# Patient Record
Sex: FEMALE | Race: WHITE | Employment: FULL TIME | URBAN - METROPOLITAN AREA
[De-identification: names, ages, dates, MRNs, and addresses within clinical notes are randomized per-mention and may not be internally consistent; named-entity substitution may affect disease eponyms.]

---

## 2019-12-07 ENCOUNTER — OFFICE VISIT (OUTPATIENT)
Dept: FAMILY MEDICINE CLINIC | Facility: CLINIC | Age: 44
End: 2019-12-07

## 2019-12-07 VITALS
WEIGHT: 139 LBS | TEMPERATURE: 99 F | RESPIRATION RATE: 16 BRPM | BODY MASS INDEX: 24.63 KG/M2 | HEIGHT: 63 IN | OXYGEN SATURATION: 98 % | SYSTOLIC BLOOD PRESSURE: 138 MMHG | HEART RATE: 82 BPM | DIASTOLIC BLOOD PRESSURE: 86 MMHG

## 2019-12-07 DIAGNOSIS — R39.9 UTI SYMPTOMS: ICD-10-CM

## 2019-12-07 DIAGNOSIS — N30.01 ACUTE CYSTITIS WITH HEMATURIA: Primary | ICD-10-CM

## 2019-12-07 PROCEDURE — 81003 URINALYSIS AUTO W/O SCOPE: CPT | Performed by: NURSE PRACTITIONER

## 2019-12-07 PROCEDURE — 99202 OFFICE O/P NEW SF 15 MIN: CPT | Performed by: NURSE PRACTITIONER

## 2019-12-07 RX ORDER — LISINOPRIL AND HYDROCHLOROTHIAZIDE 12.5; 1 MG/1; MG/1
TABLET ORAL
Refills: 99 | COMMUNITY
Start: 2019-10-10

## 2019-12-07 RX ORDER — NITROFURANTOIN 25; 75 MG/1; MG/1
100 CAPSULE ORAL 2 TIMES DAILY
Qty: 10 CAPSULE | Refills: 0 | Status: SHIPPED | OUTPATIENT
Start: 2019-12-07 | End: 2019-12-12

## 2019-12-07 NOTE — PROGRESS NOTES
Chuck Ponce is a 53year old female. HPI:   Patient presents with symptoms of UTI for 2 days. Complaining of urinary frequency, urgency, dysuria,  Denies back pain, fever, hematuria.   Pt has no history of UTI in past. Pt denies any new sexual partne Educated on medication  Educated on supportive measures  Educated on s/s of worsening sx and when to seek higher level of care  Follow up with pcp if not improving  Patient Instructions       Understanding Urinary Tract Infections (UTIs)  Most UTIs are cau

## 2020-02-23 ENCOUNTER — OFFICE VISIT (OUTPATIENT)
Dept: FAMILY MEDICINE CLINIC | Facility: CLINIC | Age: 45
End: 2020-02-23
Payer: COMMERCIAL

## 2020-02-23 VITALS
BODY MASS INDEX: 25 KG/M2 | DIASTOLIC BLOOD PRESSURE: 66 MMHG | TEMPERATURE: 99 F | OXYGEN SATURATION: 99 % | WEIGHT: 142.63 LBS | RESPIRATION RATE: 20 BRPM | HEART RATE: 85 BPM | SYSTOLIC BLOOD PRESSURE: 128 MMHG

## 2020-02-23 DIAGNOSIS — H61.23 BILATERAL IMPACTED CERUMEN: Primary | ICD-10-CM

## 2020-02-23 PROCEDURE — 99213 OFFICE O/P EST LOW 20 MIN: CPT | Performed by: NURSE PRACTITIONER

## 2020-02-23 NOTE — PROGRESS NOTES
CHIEF COMPLAINT:   Patient presents with:  Ear Problem: both ear clogged, pain, stuffy nose x4days      HPI:   Lynn Amaro is a 53year old female who presents to clinic today with complaints of bilateral ear feels clogged. Has had for 3  days.  No ea bilaterally, no wheezes or rhonchi. Breathing is non labored. CARDIO: RRR without murmur  EXTREMITIES: no cyanosis, clubbing or edema  LYMPH: no lymphadenopathy.       ASSESSMENT AND PLAN:   Phoebe Flores is a 53year old female who presents with ear pro

## 2021-10-19 ENCOUNTER — EMPLOYEE HEALTH (OUTPATIENT)
Dept: OTHER | Facility: HOSPITAL | Age: 46
End: 2021-10-19
Attending: PREVENTIVE MEDICINE

## 2021-10-19 DIAGNOSIS — Z11.1 SCREENING-PULMONARY TB: ICD-10-CM

## 2021-10-19 DIAGNOSIS — Z01.84 IMMUNITY STATUS TESTING: Primary | ICD-10-CM

## 2021-10-19 PROCEDURE — 86735 MUMPS ANTIBODY: CPT

## 2021-10-19 PROCEDURE — 86787 VARICELLA-ZOSTER ANTIBODY: CPT

## 2021-10-19 PROCEDURE — 86762 RUBELLA ANTIBODY: CPT

## 2021-10-19 PROCEDURE — 86480 TB TEST CELL IMMUN MEASURE: CPT

## 2021-10-19 PROCEDURE — 86765 RUBEOLA ANTIBODY: CPT

## 2022-06-08 ENCOUNTER — TELEPHONE (OUTPATIENT)
Dept: INTERNAL MEDICINE CLINIC | Facility: HOSPITAL | Age: 47
End: 2022-06-08

## 2022-09-09 ENCOUNTER — LAB ENCOUNTER (OUTPATIENT)
Dept: LAB | Facility: HOSPITAL | Age: 47
End: 2022-09-09
Attending: PREVENTIVE MEDICINE

## 2022-09-09 ENCOUNTER — TELEPHONE (OUTPATIENT)
Dept: INTERNAL MEDICINE CLINIC | Facility: HOSPITAL | Age: 47
End: 2022-09-09

## 2022-09-09 DIAGNOSIS — Z20.822 EXPOSURE TO COVID-19 VIRUS: ICD-10-CM

## 2022-09-09 DIAGNOSIS — Z20.822 EXPOSURE TO COVID-19 VIRUS: Primary | ICD-10-CM

## 2022-09-09 LAB — SARS-COV-2 RNA RESP QL NAA+PROBE: NOT DETECTED

## 2022-10-06 ENCOUNTER — TELEPHONE (OUTPATIENT)
Dept: INTERNAL MEDICINE CLINIC | Facility: HOSPITAL | Age: 47
End: 2022-10-06

## 2022-10-06 ENCOUNTER — LAB ENCOUNTER (OUTPATIENT)
Dept: LAB | Facility: HOSPITAL | Age: 47
End: 2022-10-06
Attending: PREVENTIVE MEDICINE
Payer: COMMERCIAL

## 2022-10-06 DIAGNOSIS — Z20.822 EXPOSURE TO COVID-19 VIRUS: Primary | ICD-10-CM

## 2022-10-06 DIAGNOSIS — Z20.822 EXPOSURE TO COVID-19 VIRUS: ICD-10-CM

## 2022-10-06 LAB — SARS-COV-2 RNA RESP QL NAA+PROBE: NOT DETECTED

## 2022-10-12 ENCOUNTER — LAB ENCOUNTER (OUTPATIENT)
Dept: LAB | Facility: HOSPITAL | Age: 47
End: 2022-10-12
Attending: PREVENTIVE MEDICINE
Payer: COMMERCIAL

## 2022-10-12 DIAGNOSIS — Z20.822 EXPOSURE TO COVID-19 VIRUS: ICD-10-CM

## 2022-10-12 LAB — SARS-COV-2 RNA RESP QL NAA+PROBE: NOT DETECTED

## 2022-10-13 NOTE — TELEPHONE ENCOUNTER
Results and RTW guidelines:    COVID RESULT:    [x] Viewed by employee in [facility]. RTW plan and instructions as indicated on triage call. Manager notified. Estimated RTW date:   [] Discussed with employee   [x] Unable to reach by phone. Sent via [employer] message      Test type:    [x] Rapid         [] Alinity         [] Outside test:       [x] NEGATIVE           Notes:     RTW PLAN:    []  If COVID positive results, off work minimum of 5 days from positive test or onset of symptoms (day 0)        On day 5, if asymptomatic or mildly symptomatic (with improving symptoms) may return to work day 6          On day 5, if symptomatic, call Employee Health for RTW screening        []  COVID positive result - call Employee Health on day 5 after symptom onset. The employee needs to be cleared by Employee Health to RTW. [] RTW immediately, continue to monitor for sx  [] RTW when sx improve; must be fever free for 24 hours w/o medications, Diarrhea/Vomiting free for 24 hours w/o medications  [] Alinity ordered; continue to monitor sx and call for new/worsening sx.   Discuss RTW guidelines with manager  [x] May continue to work  [] Follow up with PCP  [] Home until further instruction from hotline with Alinity results  INSTRUCTIONS PROVIDED:  [x]  Plan as noted above  []  Length of time to obtain results   []  Quarantine instructions  []  Masking protocol   []  S/S of worsening infection/condition and importance of prompt medical re-evaluation including when to seek emergency care  [] If symptoms develop, stay home and call hotline for rapid test order    Estimated RTW date:      [] The employee voiced understanding of above plan/instructions  [x] Manager Notified

## 2022-12-01 ENCOUNTER — IMMUNIZATION (OUTPATIENT)
Dept: LAB | Age: 47
End: 2022-12-01
Attending: EMERGENCY MEDICINE
Payer: COMMERCIAL

## 2022-12-01 DIAGNOSIS — Z23 NEED FOR VACCINATION: Primary | ICD-10-CM

## 2022-12-01 PROCEDURE — 90686 IIV4 VACC NO PRSV 0.5 ML IM: CPT

## 2022-12-01 PROCEDURE — 90471 IMMUNIZATION ADMIN: CPT

## 2022-12-02 ENCOUNTER — TELEPHONE (OUTPATIENT)
Dept: INTERNAL MEDICINE CLINIC | Facility: HOSPITAL | Age: 47
End: 2022-12-02

## 2022-12-02 DIAGNOSIS — Z20.822 EXPOSURE TO COVID-19 VIRUS: Primary | ICD-10-CM

## 2022-12-06 ENCOUNTER — LAB ENCOUNTER (OUTPATIENT)
Dept: LAB | Facility: HOSPITAL | Age: 47
End: 2022-12-06
Attending: PREVENTIVE MEDICINE
Payer: COMMERCIAL

## 2022-12-06 DIAGNOSIS — Z20.822 EXPOSURE TO COVID-19 VIRUS: ICD-10-CM

## 2022-12-06 LAB — SARS-COV-2 RNA RESP QL NAA+PROBE: NOT DETECTED

## 2022-12-06 NOTE — TELEPHONE ENCOUNTER
Results and RTW guidelines:    COVID RESULT:    [] Viewed by employee in [address] RTW plan and instructions as indicated on triage call. Manager notified. Estimated RTW date:   [] Discussed with employee   [x] Unable to reach by phone. Sent via [employer] message      Test type:    [x] Rapid         [] Alinity         [] Outside test:       [x] NEGATIVE     Ordered Alinity retest?  []Yes   [x] No (skip to RTW)   Ordered Rapid retest?   []Yes   [x] No (skip to RTW)         Notes:     RTW PLAN:    []  If COVID positive results, off work minimum of 5 days from positive test or onset of symptoms (day 0)        On day 5, if asymptomatic or mildly symptomatic (with improving symptoms) may return to work day 6          On day 5, if symptomatic, call Employee Health for RTW screening        []  COVID positive result - call Employee Health on day 5 after symptom onset. The employee needs to be cleared by Employee Health to RTW. [] RTW immediately, continue to monitor for sx  [] RTW when sx improve; must be fever free for 24 hours w/o medications, Diarrhea/Vomiting free for 24 hours w/o medications  [] Alinity ordered; continue to monitor sx and call for new/worsening sx.   Discuss RTW guidelines with manager  [x] May continue to work  [] Follow up with PCP  [] Home until further instruction from hotline with Alinity results  INSTRUCTIONS PROVIDED:  [x]  Plan as noted above  []  Length of time to obtain results   []  Quarantine instructions  []  Masking protocol   []  S/S of worsening infection/condition and importance of prompt medical re-evaluation including when to seek emergency care  [] If symptoms develop, stay home and call hotline for rapid test order    Estimated RTW date:      [] The employee voiced understanding of above plan/instructions  [x] Manager Notified

## 2023-05-11 ENCOUNTER — HOSPITAL ENCOUNTER (OUTPATIENT)
Age: 48
Discharge: HOME | End: 2023-05-11
Payer: COMMERCIAL

## 2023-05-11 VITALS
WEIGHT: 141 LBS | RESPIRATION RATE: 16 BRPM | BODY MASS INDEX: 25.95 KG/M2 | OXYGEN SATURATION: 98 % | HEART RATE: 81 BPM | SYSTOLIC BLOOD PRESSURE: 132 MMHG | HEIGHT: 62 IN | DIASTOLIC BLOOD PRESSURE: 86 MMHG | TEMPERATURE: 98 F

## 2023-05-11 DIAGNOSIS — H00.012 HORDEOLUM EXTERNUM OF RIGHT LOWER EYELID: Primary | ICD-10-CM

## 2023-05-11 PROCEDURE — 99214 OFFICE O/P EST MOD 30 MIN: CPT

## 2023-05-11 PROCEDURE — 99213 OFFICE O/P EST LOW 20 MIN: CPT

## 2023-05-11 RX ORDER — ERYTHROMYCIN 5 MG/G
1 OINTMENT OPHTHALMIC EVERY 6 HOURS
Qty: 1 G | Refills: 0 | Status: SHIPPED | OUTPATIENT
Start: 2023-05-11 | End: 2023-05-18

## 2023-05-11 NOTE — DISCHARGE INSTRUCTIONS
Follow-up with your primary care physician in one week if symptoms have not improved or symptoms are starting to get worse. Increase fluids, keep well-hydrated. Take Tylenol and Motrin for fever and pain. Apply the erythromycin every 6 hours to the right eye  Warm compress to the eye highly recommended  Return to the emergency room for with symptoms or concerns.

## 2023-05-11 NOTE — ED INITIAL ASSESSMENT (HPI)
Patient presents to IC with c/o \"pimple\"to lower lid of right eye x 3 days with swelling. No fever.

## 2023-12-30 ENCOUNTER — TELEPHONE (OUTPATIENT)
Dept: INTERNAL MEDICINE CLINIC | Facility: HOSPITAL | Age: 48
End: 2023-12-30

## 2023-12-30 ENCOUNTER — LAB ENCOUNTER (OUTPATIENT)
Dept: LAB | Age: 48
End: 2023-12-30
Attending: PREVENTIVE MEDICINE
Payer: COMMERCIAL

## 2023-12-30 DIAGNOSIS — Z20.822 EXPOSURE TO CONFIRMED CASE OF COVID-19: Primary | ICD-10-CM

## 2023-12-30 DIAGNOSIS — Z20.822 EXPOSURE TO CONFIRMED CASE OF COVID-19: ICD-10-CM

## 2023-12-30 PROCEDURE — 87635 SARS-COV-2 COVID-19 AMP PRB: CPT

## 2023-12-30 NOTE — TELEPHONE ENCOUNTER
[x] EH  []TANISHA   [] [facility]  Manager : Bib High    HAVE YOU RECEIVED THE COVID-19 Vaccine? Yes [x]    No []          If yes, date(s) received: 01/22/2021; 02/12/2021           Which vaccine:  Pfizer [x]     Moderna []    J&J []      SYMPTOMS (reported via dashboard):  [x] asymptomatic  [] symptomatic  [] GI symptoms only    Symptom onset date: n/a  Fever   > 100F             Yes []      Cough                          Yes []      Shortness of breath  Yes []      Congestion                 Yes []      Runny nose                Yes []        Loss of Smell              Yes []        Loss of Taste             Yes []       Sore throat                 Yes []       Fatigue                        Yes []       Body Aches                Yes []        Chills                           Yes []        Headache                   Yes []             GI symptoms             Yes []     No [x]                     Nausea   []          Vomiting            []                                    Diarrhea  []          Upset stomach []      Employee has positive COVID Exposure?  Yes [x]     No []    Date of exposure: 12/28/2023  []  Coworker                       [x] patient                        [] Family/friend    Employee has a history of Covid?  Yes [x]     No []   If Yes, when: 07/2022    When was the last shift you worked?: 12/29/2023      PLAN:     COVID-19 testing ordered: [] Rapid    [x] Alinity              Date test is to be taken:   12/30/2023    []  Outside testing                           Notes:    INSTRUCTIONS PROVIDED:    [x]  Employee was instructed to call Central scheduling at [phone] or use [employer] to make an appointment for their testing   []  May return to work if employee views negative result in MyChart and remains fever, vomiting, and diarrhea free  [x]  May continue to work if remains asymptomatic and views negative result in MyChart  []  Follow up for condition update when resulting  []  If symptoms develop,  stay home and call hotline for rapid test order  []  If COVID positive results, off work minimum of 5 days from positive test or onset of symptoms (day 0)     [x]  Plan noted above  [x]  Length of time to obtain results  []  Quarantine instructions  [x]  S/S of worsening infection/condition and importance of prompt medical re-evaluation including when to seek emergency care.   [] The employee voiced understanding

## 2023-12-31 LAB — SARS-COV-2 RNA RESP QL NAA+PROBE: NOT DETECTED

## 2024-01-01 NOTE — TELEPHONE ENCOUNTER
Results and RTW guidelines:    COVID RESULT:    [x] Viewed by employee in [employer].  RTW plan and instructions as indicated on triage call.  Manager notified.  Estimated RTW date: 12/30/23  [] Discussed with employee   [] Unable to reach by phone.  Sent via [employer] message      Test type:    [] Rapid         [x] Alinity         [] Outside test:       [x] NEGATIVE     Ordered Alinity retest?  [x]Yes   [] No (skip to RTW)   Ordered Rapid retest?   []Yes   [x] No (skip to RTW)           Dated to be taken:  01/03/24    If Yes, PLACE ORDER NOW and instruct the following:  -Originally Symptomatic or Now Symptoms:   -RTW when sx improve- fever free for 24 hours w/o medications, Diarrhea/Vomiting for 24 hours w/o medications    -Originally  Asymptomatic  -Asymptomatic AND Vaccinated or Unvaccinated or Prior infection in past 90 days:   -May work and continue to monitor symptoms for the next 10 days.                                         -Alinity test day 2, Alinity test day 5 (day 0 - exposure)

## 2024-01-03 ENCOUNTER — LAB ENCOUNTER (OUTPATIENT)
Dept: LAB | Facility: HOSPITAL | Age: 49
End: 2024-01-03
Attending: PREVENTIVE MEDICINE

## 2024-01-03 DIAGNOSIS — Z20.822 EXPOSURE TO CONFIRMED CASE OF COVID-19: ICD-10-CM

## 2024-01-03 PROCEDURE — 87635 SARS-COV-2 COVID-19 AMP PRB: CPT

## 2024-01-04 LAB — SARS-COV-2 RNA RESP QL NAA+PROBE: NOT DETECTED

## 2024-01-04 NOTE — TELEPHONE ENCOUNTER
Results and RTW guidelines:    COVID RESULT:    [x] Viewed by employee in [employer].  RTW plan and instructions as indicated on triage call.  Manager notified.  Estimated RTW date:   [] Discussed with employee   [] Unable to reach by phone.  Sent via [employer] message      Test type:    [] Rapid         [x] Alinity         [] Outside test:       [x] NEGATIVE     Ordered Alinity retest?  []Yes   [x] No (skip to RTW)   Ordered Rapid retest?   []Yes   [x] No (skip to RTW)           Dated to be taken:      If Yes, PLACE ORDER NOW and instruct the following:  -Originally Symptomatic or Now Symptoms:   -RTW when sx improve- fever free for 24 hours w/o medications, Diarrhea/Vomiting for 24 hours w/o medications    -Originally  Asymptomatic  -Asymptomatic AND Vaccinated or Unvaccinated or Prior infection in past 90 days:   -May work and continue to monitor symptoms for the next 10 days.                                         -Alinity test day 2, Alinity test day 5 (day 0 - exposure)     Notes:     RTW PLAN:    []  If COVID positive results, off work minimum of 5 days from positive test or onset of symptoms (day 0)        On day 5, if asymptomatic or mildly symptomatic (with improving symptoms) may return to work day 6          On day 5, if symptomatic, call Employee Health for RTW screening        []  COVID positive result - call Employee Health on day 5 after symptom onset.  The employee needs to be cleared by Employee Health to RTW.  [] RTW immediately, continue to monitor for sx  [] RTW when sx improve; must be fever free for 24 hours w/o medications, Diarrhea/Vomiting free for 24 hours w/o medications  [] Alinity ordered; continue to monitor sx and call for new/worsening sx.  Discuss RTW guidelines with manager  [x] May continue to work  [] Follow up with PCP  [] Home until further instruction from hotline with Alinity results  INSTRUCTIONS PROVIDED:  [x]  Plan as noted above  []  Length of time to obtain results   []   Quarantine instructions  []  Masking protocol   []  S/S of worsening infection/condition and importance of prompt medical re-evaluation including when to seek emergency care  [] If symptoms develop, stay home and call hotline for rapid test order    Estimated RTW date:      [] The employee voiced understanding of above plan/instructions  [x] Manager Notified

## 2024-04-16 ENCOUNTER — HOSPITAL ENCOUNTER (OUTPATIENT)
Dept: MAMMOGRAPHY | Age: 49
Discharge: HOME | End: 2024-04-16
Attending: FAMILY MEDICINE
Payer: COMMERCIAL

## 2024-04-16 DIAGNOSIS — Z12.31 ENCOUNTER FOR SCREENING MAMMOGRAM FOR MALIGNANT NEOPLASM OF BREAST: ICD-10-CM

## 2024-04-16 PROCEDURE — 77063 BREAST TOMOSYNTHESIS BI: CPT | Performed by: FAMILY MEDICINE

## 2024-04-16 PROCEDURE — 77067 SCR MAMMO BI INCL CAD: CPT | Performed by: FAMILY MEDICINE

## 2024-05-02 ENCOUNTER — HOSPITAL ENCOUNTER (OUTPATIENT)
Dept: MAMMOGRAPHY | Facility: HOSPITAL | Age: 49
Discharge: HOME | End: 2024-05-02
Attending: FAMILY MEDICINE
Payer: COMMERCIAL

## 2024-05-02 DIAGNOSIS — R92.2 INCONCLUSIVE MAMMOGRAM: ICD-10-CM

## 2024-05-02 PROCEDURE — 77065 DX MAMMO INCL CAD UNI: CPT | Performed by: FAMILY MEDICINE

## 2024-05-02 PROCEDURE — 76642 ULTRASOUND BREAST LIMITED: CPT | Performed by: FAMILY MEDICINE

## 2024-05-02 PROCEDURE — 77061 BREAST TOMOSYNTHESIS UNI: CPT | Performed by: FAMILY MEDICINE

## 2024-05-02 NOTE — IMAGING NOTE
This Breast Care RN assisted Dr. Mccarty with recommendation for a left breast 1 site ultrasound guided biopsy for mass.  Procedure reviewed and all questions answered. Emotional and educational support given. On the day of the biopsy, pt instructed to take Tylenol 1000mg PO, eat a light meal & bring or wear a sports bra.  Post biopsy care also reviewed with pt to include NO lifting more than 5lbs, no exercising or housework (limit upper body movement) for 24-48 hrs post biopsy. Patient denies blood thinners, bleeding disorders, liver disease, and chemo.Pt verbalized understanding. An appt is being held for Monday, 5-6-24 at 8:30 am, arriving at 8 am, pending order.

## 2024-05-06 ENCOUNTER — HOSPITAL ENCOUNTER (OUTPATIENT)
Dept: MAMMOGRAPHY | Facility: HOSPITAL | Age: 49
Discharge: HOME | End: 2024-05-06
Attending: FAMILY MEDICINE
Payer: COMMERCIAL

## 2024-05-06 DIAGNOSIS — N63.0 BREAST MASS: ICD-10-CM

## 2024-05-06 PROCEDURE — 88305 TISSUE EXAM BY PATHOLOGIST: CPT | Performed by: FAMILY MEDICINE

## 2024-05-06 PROCEDURE — 19083 BX BREAST 1ST LESION US IMAG: CPT | Performed by: FAMILY MEDICINE

## 2024-05-06 PROCEDURE — 77065 DX MAMMO INCL CAD UNI: CPT | Performed by: FAMILY MEDICINE

## 2024-05-07 ENCOUNTER — TELEPHONE (OUTPATIENT)
Dept: MAMMOGRAPHY | Facility: HOSPITAL | Age: 49
End: 2024-05-07

## 2024-05-07 NOTE — TELEPHONE ENCOUNTER
Telephoned Amandareynold Suggs and name,  verified with patient. Notified Amanda Suggs of left breast negative for malignancy but positive for fibroepithelial lesion biopsy result. Concordance pending. Amanda Es reports biopsy site is healing well.  Radiologist recommends surgical consultation. Unable to get a surgeon referral from Dr Kimble's office at this time. The office plans to call back. Patient works at [employer] and prefers to see a surgeon here. Patient provided with the contact information for [facility] breast surgeons and instructed to call one of them for a consultation appt. Pt verbalized understanding and had no further questions at this time.

## 2024-05-20 NOTE — CONSULTS
Breast Surgery New Patient Consultation    Amanda Suggs is a 48 year old patient, referred by Dr. 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    History of Present Illness:   Ms. Amanda Suggs is a 48 year old woman with history of hypertension who presents for evaluation of left breast fibroepithelial lesion.     About 2 months ago she self palpated a left breast lump.  She underwent screening mammogram on 2024 that showed a focal asymmetry in the left breast.  It also showed density D breasts.  On 2024 she underwent diagnostic imaging that showed a lobulated mass at the 1 o'clock position 4 cm from the nipple.  It measured 3 x 4.5 x 2.2 cm.  She underwent biopsy on 2024, a coil clip was placed.  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 The differential diagnosis includes cellular fibroadenoma versus phyllodes tumor.    She denies any nipple discharge, skin changes, or axillary adenopathy. She does not have breast pain. She has a history of bilateral breast cyst aspiration in 2016.  In 2016 she also had a left breast excision of a cyst that was benign on final pathology.  She does have family history of breast cancer: Her mother had breast cancer in her late 60s, Her maternal aunt had breast cancer at the age of 60. She does not have a history of genetic testing.     She is from [place]. She lived in [place] for several years. 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 She is going to start MA school soon.        No past medical history on file.    Past Surgical History:   Procedure Laterality Date    Cyst aspiration left Left 2016    benign       Gynecological History:  Menarche at age 14 and LMP 22  Pt is a   Pt was 29 years old at time of first pregnancy.    She has cumulative breastfeeding history of 6 months  Age of Menopause: n/a   She denies any history of hormone replacement therapy   She denies any history of  oral contraceptive use   She denies infertility treatment to achieve pregnancy.    Medications:     metoprolol succinate ER 50 MG Oral Tablet 24 Hr Take 1 tablet (50 mg total) by mouth daily.         Allergies:    No Known Allergies    Family History:   Family History   Problem Relation Age of Onset    Breast Cancer Mother 60    Breast Cancer Maternal Aunt 60       She is not of Ashkenazi Catholic ancestry.    Social History:  History   Alcohol Use Never       History   Smoking Status    Never   Smokeless Tobacco    Never       Review of Systems:    Review of Systems   Constitutional:  Negative for activity change, appetite change, chills, fatigue, fever and unexpected weight change.   HENT:  Negative for ear pain, hearing loss, nosebleeds, sore throat, trouble swallowing and voice change.    Eyes:  Negative for pain and visual disturbance.   Respiratory:  Negative for cough, chest tightness and shortness of breath.    Cardiovascular:  Negative for chest pain, palpitations and leg swelling.   Gastrointestinal:  Negative for nausea, vomiting, abdominal pain, diarrhea, constipation and blood in stool.   Endocrine: Negative for cold intolerance and heat intolerance.   Genitourinary:  Negative for dysuria, hematuria and difficulty urinating.   Musculoskeletal:  Negative for back pain, joint swelling, joint pain and neck pain.   Skin:  Negative for color change, rash and wound.   Allergic/Immunologic: Negative for environmental allergies.   Neurological:  Negative for tremors, syncope, facial asymmetry, speech difficulty, weakness, numbness and headaches.   Hematological:  Negative for adenopathy. Does not bruise/bleed easily.   Psychiatric/Behavioral:  Negative for hallucinations, behavioral problems, confusion, agitation and depressed mood.       Otherwise as per HPI.    Physical Exam:    /77 (BP Location: Right arm, Patient Position: Sitting, Cuff Size: adult)   Pulse 84   Temp 98.1 °F (36.7 °C) (Tympanic)    Resp 16   Wt 67.6 kg (149 lb)   SpO2 97%   BMI 27.25 kg/m²     Physical Exam  Vitals reviewed.   Constitutional:       Appearance: Normal appearance.   HENT:      Head: Normocephalic and atraumatic.   Eyes:      Extraocular Movements: Extraocular movements intact.      Pupils: Pupils are equal, round, and reactive to light.   Cardiovascular:      Rate and Rhythm: Normal rate.   Pulmonary:      Effort: Pulmonary effort is normal.   Chest:   Breasts:     Right: Normal. No mass, nipple discharge, skin change or tenderness.      Left: Normal. No mass, nipple discharge, skin change or tenderness.          Comments: Well healed surgical incision  Abdominal:      General: Abdomen is flat.      Palpations: Abdomen is soft.   Musculoskeletal:         General: Normal range of motion.      Cervical back: Normal range of motion and neck supple.   Lymphadenopathy:      Upper Body:      Right upper body: No supraclavicular or axillary adenopathy.      Left upper body: No supraclavicular or axillary adenopathy.   Skin:     General: Skin is warm and dry.   Neurological:      General: No focal deficit present.      Mental Status: She is alert and oriented to person, place, and time.   Psychiatric:         Mood and Affect: Mood normal.          Bra size: 36B    Labs/imaging: reviewed in EPIC    Impression:   Ms. Amanda Suggs is a 48 year old woman that presents for evaluation of fibroepithelial lesion.    Discussion and Plan:  I had a discussion with the Patient regarding her breast exam. On exam today I found a  4x3 cm palpable, mobile, circumscribed mass located at 1:00, about 2 cm from nipple. I personally reviewed her recent mammography and ultrasound and we discussed this.    We discussed that a fibroadenoma is a benign growth of tissue that does not increase risk of breast cancer. We discussed that fibroadenomas are usually removed if they have rapid growth, are over 3 cm in size, have an irregular shape/indistinct  margins on imaging, or are causing breast deformity.  We discussed that phyllodes tumor can be benign borderline or malignant.  We discussed that at times only final pathology can reveal a diagnosis.  At this point we should remove this lesion in order to confirm final pathology.  I do recommend excision at this time.  We discussed left breast MAGUI localized excisional biopsy. The risks, benefits, and alternatives were discussed including, but not limited to, seroma development, infection, and bleeding. We also discussed the possibility for upstaging of pathology which would require further surgery on another day. The patient agrees to proceed.      Due to her density D breasts patient would qualify for annual screening ultrasound. We calculated her Tyrer Cuzick score, which revealed an estimated lifetime breast cancer risk of 36.2% and a 10-year breast cancer risk of 9.5%. (Results will be scanned into the chart.) This lifetime risk classifies the patient as \"high risk\" for breast cancer.     Genetics: Patient is interested in genetic testing and has a family history, will refer    Further screening:   Mammogram: resume annual mammograms in April 2025  Screening ultrasound versus MRI: qualifies for MRI screening due to high risk as well as breast density. Patient will decide whether she would like to continue with ultrasound or MRI. Next one would be around October 2024    Chemoprophylaxis: briefly discussed, will touch base again after surgery    Return to clinic:   1 year for breast exam due to high risk of breast cancer  See PCP/OB/GYN at least annually for additional breast exam    She was given ample opportunity for questions and those questions were answered to her satisfaction. She has been encouraged to contact the office with any questions or concerns prior to her next appointment.     This encounter lasted a total of 60 minutes, more than 50% of which was dedicated to the discussion of management options.      Danny Asher MD  Breast Surgical Oncology    CC: Ellen Wellington

## 2024-05-21 ENCOUNTER — OFFICE VISIT (OUTPATIENT)
Dept: SURGERY | Facility: CLINIC | Age: 49
End: 2024-05-21

## 2024-05-21 ENCOUNTER — TELEPHONE (OUTPATIENT)
Dept: SURGERY | Facility: CLINIC | Age: 49
End: 2024-05-21

## 2024-05-21 VITALS
OXYGEN SATURATION: 97 % | TEMPERATURE: 98 F | WEIGHT: 149 LBS | BODY MASS INDEX: 27 KG/M2 | HEART RATE: 84 BPM | RESPIRATION RATE: 16 BRPM | DIASTOLIC BLOOD PRESSURE: 77 MMHG | SYSTOLIC BLOOD PRESSURE: 143 MMHG

## 2024-05-21 DIAGNOSIS — N63.21 MASS OF UPPER OUTER QUADRANT OF LEFT BREAST: Primary | ICD-10-CM

## 2024-05-21 PROCEDURE — 99205 OFFICE O/P NEW HI 60 MIN: CPT | Performed by: SURGERY

## 2024-05-21 RX ORDER — METOPROLOL SUCCINATE 50 MG/1
50 TABLET, EXTENDED RELEASE ORAL DAILY
COMMUNITY
Start: 2024-04-22

## 2024-05-21 NOTE — PATIENT INSTRUCTIONS
Dr. Danny Asher  Tel: [phone]  Fax: [phone] [place] [address]  [phone]     Surgery/Procedure: Left breast Saba localized excisional Biopsy,      Anesthesia:   Gen  Surgery Length:   1 hour CPT:  49119   Wire LOC:   No   Nuc Med:   No   Saba Seed:  Yes     Dx & ICD-10: N60.22   Radiology Instructions: Left Breast 1:00 postion 4cm from the nipple, Coil Clipped, Biopsy Demonstrated, -Fibroepithelial lesion with cellular stroma (up to 1.1 cm).     Location (quadrant, o'clock, and cm from the nipple if included), clip shape (ie. Tophat, coil, 1, etc.), and pathology (ie. Biopsy confirmed...). (If pathology is discordant, write, \"biopsy proven___, discordant findings\")   _______________________________________________________________________________    Someone must accompany you the day of the procedure to drive you home safely, because of anesthesia.  You will need an adult  to stay with you the first night following your surgery.  You must remove any kind of makeup, acrylic nails, lotions, powders, creams or deodorant.  [facility] ONLY: Pre-admission will give instructions on when to take Gatorade and Tylenol/acetaminophen prior to your surgery, purchase 2 - 12oz bottles of regular Gatorade (NOT RED/SUGAR FREE). Otherwise, you may not eat or drink anything else after 11PM the night before surgery.    Wear comfortable clothing that can be easily removed.  If you wear dentures, contacts lenses, or any prosthesis, you will be asked to remove them.  Do not drink alcohol or smoke 24 hours prior to your procedure.  Bring a picture ID and your insurance card.  Covid-19 testing is no longer required before surgery unless you are experiencing symptoms such as fever, cough, congestion, etc.  The Pre-Admission Testing Department will call the day before to confirm your procedure, give you the time you need to arrive by and directions on where to go. They begin making  calls after 2pm, if you are not contacted by 4pm, please call the surgeon's office listed above.  Do not take any blood thinners at least one week prior to the procedure/surgery. This includes aspirin, baby aspirin, Motrin, Ibuprofen, herbal supplements, diet medications, vitamin E, fish oil and green tea supplements. Please check other supplements for these ingredients. *TYLENOL or acetaminophen is acceptable*  If you take Coumadin, Plavix, Xarelto, or Eliquis, please contact your prescribing physician for special instructions on how long to hold. If you take insulin contact your primary care physician for special instructions.  Our Surgery scheduler, Sera, will be contacting you to discuss surgery dates. If you have any questions related to scheduling your surgery, please reach out to her at [phone].  _____________________________________________________________________  PRE-OPERATIVE TESTING IF INDICATED BELOW  Please Complete ASAP ( at least 14-21 days prior to surgery)  Please call Central Scheduling to schedule an appointment for pre-operative labs/tests @ [phone]  [] CBC [] BMP [] CMP [] EKG    [] PT, PTT, INR [] Cardiac Clearance  [] H&P Medical Clearance [] Chest X-ray             Does the patient have a pacemaker or ICD?                          Does the patient have sleep apnea?                               [] Yes   [] No                                                    [] Yes   [] No         Please call [phone] to schedule an appointment with genetic counselor.

## 2024-05-22 ENCOUNTER — TELEPHONE (OUTPATIENT)
Dept: MAMMOGRAPHY | Facility: HOSPITAL | Age: 49
End: 2024-05-22

## 2024-05-22 RX ORDER — SPIRONOLACTONE 25 MG/1
25 TABLET ORAL DAILY
COMMUNITY

## 2024-05-22 NOTE — TELEPHONE ENCOUNTER
Phoned Amanda Suggs regarding Saba  localization process of breast for excisional biopsy scheduled for 6-06-24 with Dr. Asher. Procedure explained and all questions answered. Patient transferred to schedulers to schedule procedure.

## 2024-05-23 ENCOUNTER — LABORATORY ENCOUNTER (OUTPATIENT)
Dept: LAB | Facility: HOSPITAL | Age: 49
End: 2024-05-23
Attending: SURGERY

## 2024-05-23 ENCOUNTER — EKG ENCOUNTER (OUTPATIENT)
Dept: LAB | Facility: HOSPITAL | Age: 49
End: 2024-05-23
Attending: SURGERY

## 2024-05-23 DIAGNOSIS — Z01.818 PRE-OP TESTING: ICD-10-CM

## 2024-05-23 LAB
ANION GAP SERPL CALC-SCNC: 8 MMOL/L (ref 0–18)
BUN BLD-MCNC: 11 MG/DL (ref 9–23)
CALCIUM BLD-MCNC: 9.4 MG/DL (ref 8.5–10.1)
CHLORIDE SERPL-SCNC: 104 MMOL/L (ref 98–112)
CO2 SERPL-SCNC: 26 MMOL/L (ref 21–32)
CREAT BLD-MCNC: 0.64 MG/DL
EGFRCR SERPLBLD CKD-EPI 2021: 108 ML/MIN/1.73M2 (ref 60–?)
FASTING STATUS PATIENT QL REPORTED: YES
GLUCOSE BLD-MCNC: 96 MG/DL (ref 70–99)
OSMOLALITY SERPL CALC.SUM OF ELEC: 285 MOSM/KG (ref 275–295)
POTASSIUM SERPL-SCNC: 3.6 MMOL/L (ref 3.5–5.1)
SODIUM SERPL-SCNC: 138 MMOL/L (ref 136–145)

## 2024-05-23 PROCEDURE — 80048 BASIC METABOLIC PNL TOTAL CA: CPT

## 2024-05-23 PROCEDURE — 36415 COLL VENOUS BLD VENIPUNCTURE: CPT

## 2024-05-23 PROCEDURE — 93005 ELECTROCARDIOGRAM TRACING: CPT

## 2024-05-23 PROCEDURE — 93010 ELECTROCARDIOGRAM REPORT: CPT | Performed by: INTERNAL MEDICINE

## 2024-05-24 LAB
ATRIAL RATE: 77 BPM
P AXIS: 47 DEGREES
P-R INTERVAL: 134 MS
Q-T INTERVAL: 390 MS
QRS DURATION: 86 MS
QTC CALCULATION (BEZET): 441 MS
R AXIS: 40 DEGREES
T AXIS: 38 DEGREES
VENTRICULAR RATE: 77 BPM

## 2024-05-28 ENCOUNTER — HOSPITAL ENCOUNTER (OUTPATIENT)
Dept: MAMMOGRAPHY | Facility: HOSPITAL | Age: 49
Discharge: HOME | End: 2024-05-28
Attending: SURGERY

## 2024-05-28 DIAGNOSIS — N63.21 MASS OF UPPER OUTER QUADRANT OF LEFT BREAST: ICD-10-CM

## 2024-05-28 PROCEDURE — 77065 DX MAMMO INCL CAD UNI: CPT | Performed by: SURGERY

## 2024-05-28 PROCEDURE — 19285 PERQ DEV BREAST 1ST US IMAG: CPT | Performed by: SURGERY

## 2024-05-28 NOTE — IMAGING NOTE
Assisted  with ultrasound guided sanjuanita  localization of the left breast.   Amanda Suggs identified with spelling of name and date of birth.   Medications and allergies reviewed. NKDA reported    History: left breast--Fibroepithelial lesion   Surgery: Left breast Sanjuanita localized excisional Biopsy     Order verified.  Procedure explained and questions answered. Amanda Suggs verbalized understanding and agreement.  Educational materials provided  1054: Written consent obtained.     1115: Scans taken by Vania- ultrasound technologist    1121: Dr. Martinez present    1122: Time out complete.    1122: Site prepped and draped in a sterile manner.   1124: Lidocaine administered for anesthetic affect.  1124: 16G x 5cm reflector needle placed- Left Breast 1:00 position 4cm from the nipple, Coil Clipped, Biopsy Demonstrated, -Fibroepithelial lesion with cellular stroma   1124: Sanjuanita seed deployed  1126: Sanjuanita seed NOT audible with detector wand. Lot # [id number] Expiration 10-07-25  1128  Site prepped and draped in a sterile manner.   1129:  Lidocaine administered for anesthetic affect.  1130:16G x 5cm reflector needle placed- Left Breast 1:00 position 4cm from the nipple, Coil Clipped, Biopsy Demonstrated, -Fibroepithelial lesion with cellular stroma   1130: Sanjuanita seed deployed  1131: Sanjuanita seed audible with detector wand Lot# [id number] Expiration 10-07-25    Emotional support provided.  Amanda Suggs tolerated procedure well.     Site cleaned.  Steri strip applied     Amanda Suggs brought  to mammography in stable condition. Ms. Suggs  without complaints or concerns at this time.   1141: Report and transfer of care to mammography technologist-Crystal  Mammography staff to discharge Amanda Suggs after imaging complete.

## 2024-06-05 ENCOUNTER — ANESTHESIA EVENT (OUTPATIENT)
Dept: SURGERY | Facility: HOSPITAL | Age: 49
End: 2024-06-05
Payer: COMMERCIAL

## 2024-06-06 ENCOUNTER — ANESTHESIA (OUTPATIENT)
Dept: SURGERY | Facility: HOSPITAL | Age: 49
End: 2024-06-06
Payer: COMMERCIAL

## 2024-06-06 ENCOUNTER — HOSPITAL ENCOUNTER (OUTPATIENT)
Dept: MAMMOGRAPHY | Facility: HOSPITAL | Age: 49
Discharge: HOME | End: 2024-06-06
Attending: SURGERY | Admitting: SURGERY
Payer: COMMERCIAL

## 2024-06-06 ENCOUNTER — HOSPITAL ENCOUNTER (OUTPATIENT)
Facility: HOSPITAL | Age: 49
Setting detail: HOSPITAL OUTPATIENT SURGERY
Discharge: HOME | End: 2024-06-06
Attending: SURGERY | Admitting: SURGERY
Payer: COMMERCIAL

## 2024-06-06 VITALS
OXYGEN SATURATION: 97 % | DIASTOLIC BLOOD PRESSURE: 73 MMHG | HEART RATE: 85 BPM | BODY MASS INDEX: 26.19 KG/M2 | HEIGHT: 63 IN | SYSTOLIC BLOOD PRESSURE: 113 MMHG | RESPIRATION RATE: 18 BRPM | WEIGHT: 147.81 LBS | TEMPERATURE: 98 F

## 2024-06-06 DIAGNOSIS — N63.21 MASS OF UPPER OUTER QUADRANT OF LEFT BREAST: ICD-10-CM

## 2024-06-06 DIAGNOSIS — Z01.818 PRE-OP TESTING: Primary | ICD-10-CM

## 2024-06-06 LAB — B-HCG UR QL: NEGATIVE

## 2024-06-06 PROCEDURE — 76098 X-RAY EXAM SURGICAL SPECIMEN: CPT | Performed by: SURGERY

## 2024-06-06 PROCEDURE — 81025 URINE PREGNANCY TEST: CPT

## 2024-06-06 PROCEDURE — 88305 TISSUE EXAM BY PATHOLOGIST: CPT | Performed by: SURGERY

## 2024-06-06 PROCEDURE — 0HBU0ZZ EXCISION OF LEFT BREAST, OPEN APPROACH: ICD-10-PCS | Performed by: SURGERY

## 2024-06-06 RX ORDER — ACETAMINOPHEN 500 MG
1000 TABLET ORAL ONCE
Status: DISCONTINUED | OUTPATIENT
Start: 2024-06-06 | End: 2024-06-06 | Stop reason: HOSPADM

## 2024-06-06 RX ORDER — DEXAMETHASONE SODIUM PHOSPHATE 4 MG/ML
VIAL (ML) INJECTION AS NEEDED
Status: DISCONTINUED | OUTPATIENT
Start: 2024-06-06 | End: 2024-06-06 | Stop reason: SURG

## 2024-06-06 RX ORDER — MIDAZOLAM HYDROCHLORIDE 1 MG/ML
1 INJECTION INTRAMUSCULAR; INTRAVENOUS EVERY 5 MIN PRN
Status: DISCONTINUED | OUTPATIENT
Start: 2024-06-06 | End: 2024-06-06

## 2024-06-06 RX ORDER — HYDROMORPHONE HYDROCHLORIDE 1 MG/ML
0.4 INJECTION, SOLUTION INTRAMUSCULAR; INTRAVENOUS; SUBCUTANEOUS EVERY 5 MIN PRN
Status: DISCONTINUED | OUTPATIENT
Start: 2024-06-06 | End: 2024-06-06

## 2024-06-06 RX ORDER — HEPARIN SODIUM 5000 [USP'U]/ML
5000 INJECTION, SOLUTION INTRAVENOUS; SUBCUTANEOUS ONCE
Status: COMPLETED | OUTPATIENT
Start: 2024-06-06 | End: 2024-06-06

## 2024-06-06 RX ORDER — ONDANSETRON 2 MG/ML
4 INJECTION INTRAMUSCULAR; INTRAVENOUS EVERY 6 HOURS PRN
Status: DISCONTINUED | OUTPATIENT
Start: 2024-06-06 | End: 2024-06-06

## 2024-06-06 RX ORDER — SODIUM CHLORIDE, SODIUM LACTATE, POTASSIUM CHLORIDE, CALCIUM CHLORIDE 600; 310; 30; 20 MG/100ML; MG/100ML; MG/100ML; MG/100ML
INJECTION, SOLUTION INTRAVENOUS CONTINUOUS
Status: DISCONTINUED | OUTPATIENT
Start: 2024-06-06 | End: 2024-06-06

## 2024-06-06 RX ORDER — EPHEDRINE SULFATE 50 MG/ML
INJECTION INTRAVENOUS AS NEEDED
Status: DISCONTINUED | OUTPATIENT
Start: 2024-06-06 | End: 2024-06-06 | Stop reason: SURG

## 2024-06-06 RX ORDER — LIDOCAINE HYDROCHLORIDE AND EPINEPHRINE 10; 10 MG/ML; UG/ML
INJECTION, SOLUTION INFILTRATION; PERINEURAL AS NEEDED
Status: DISCONTINUED | OUTPATIENT
Start: 2024-06-06 | End: 2024-06-06 | Stop reason: HOSPADM

## 2024-06-06 RX ORDER — IBUPROFEN 600 MG/1
600 TABLET ORAL EVERY 6 HOURS PRN
Qty: 50 TABLET | Refills: 0 | Status: SHIPPED | COMMUNITY
Start: 2024-06-06

## 2024-06-06 RX ORDER — LIDOCAINE HYDROCHLORIDE 10 MG/ML
INJECTION, SOLUTION EPIDURAL; INFILTRATION; INTRACAUDAL; PERINEURAL AS NEEDED
Status: DISCONTINUED | OUTPATIENT
Start: 2024-06-06 | End: 2024-06-06 | Stop reason: SURG

## 2024-06-06 RX ORDER — NALOXONE HYDROCHLORIDE 0.4 MG/ML
0.08 INJECTION, SOLUTION INTRAMUSCULAR; INTRAVENOUS; SUBCUTANEOUS AS NEEDED
Status: DISCONTINUED | OUTPATIENT
Start: 2024-06-06 | End: 2024-06-06

## 2024-06-06 RX ORDER — BUPIVACAINE HYDROCHLORIDE 5 MG/ML
INJECTION, SOLUTION EPIDURAL; INTRACAUDAL AS NEEDED
Status: DISCONTINUED | OUTPATIENT
Start: 2024-06-06 | End: 2024-06-06 | Stop reason: HOSPADM

## 2024-06-06 RX ORDER — ACETAMINOPHEN 325 MG/1
650 TABLET ORAL
COMMUNITY

## 2024-06-06 RX ORDER — SERTRALINE HYDROCHLORIDE 25 MG/1
25 TABLET, FILM COATED ORAL DAILY
COMMUNITY
Start: 2024-05-06

## 2024-06-06 RX ORDER — ONDANSETRON 2 MG/ML
INJECTION INTRAMUSCULAR; INTRAVENOUS AS NEEDED
Status: DISCONTINUED | OUTPATIENT
Start: 2024-06-06 | End: 2024-06-06 | Stop reason: SURG

## 2024-06-06 RX ORDER — HYDROCODONE BITARTRATE AND ACETAMINOPHEN 5; 325 MG/1; MG/1
1 TABLET ORAL ONCE AS NEEDED
Status: DISCONTINUED | OUTPATIENT
Start: 2024-06-06 | End: 2024-06-06

## 2024-06-06 RX ORDER — ACETAMINOPHEN 500 MG
1000 TABLET ORAL EVERY 6 HOURS PRN
Qty: 50 TABLET | Refills: 0 | Status: SHIPPED | COMMUNITY
Start: 2024-06-06

## 2024-06-06 RX ORDER — HYDROMORPHONE HYDROCHLORIDE 1 MG/ML
0.2 INJECTION, SOLUTION INTRAMUSCULAR; INTRAVENOUS; SUBCUTANEOUS EVERY 5 MIN PRN
Status: DISCONTINUED | OUTPATIENT
Start: 2024-06-06 | End: 2024-06-06

## 2024-06-06 RX ORDER — HYDROCODONE BITARTRATE AND ACETAMINOPHEN 5; 325 MG/1; MG/1
2 TABLET ORAL ONCE AS NEEDED
Status: DISCONTINUED | OUTPATIENT
Start: 2024-06-06 | End: 2024-06-06

## 2024-06-06 RX ORDER — PROCHLORPERAZINE EDISYLATE 5 MG/ML
5 INJECTION INTRAMUSCULAR; INTRAVENOUS EVERY 8 HOURS PRN
Status: DISCONTINUED | OUTPATIENT
Start: 2024-06-06 | End: 2024-06-06

## 2024-06-06 RX ORDER — SCOLOPAMINE TRANSDERMAL SYSTEM 1 MG/1
1 PATCH, EXTENDED RELEASE TRANSDERMAL ONCE
Status: DISCONTINUED | OUTPATIENT
Start: 2024-06-06 | End: 2024-06-06 | Stop reason: HOSPADM

## 2024-06-06 RX ORDER — DIPHENHYDRAMINE HYDROCHLORIDE 50 MG/ML
12.5 INJECTION INTRAMUSCULAR; INTRAVENOUS AS NEEDED
Status: DISCONTINUED | OUTPATIENT
Start: 2024-06-06 | End: 2024-06-06

## 2024-06-06 RX ORDER — MEPERIDINE HYDROCHLORIDE 25 MG/ML
12.5 INJECTION INTRAMUSCULAR; INTRAVENOUS; SUBCUTANEOUS AS NEEDED
Status: DISCONTINUED | OUTPATIENT
Start: 2024-06-06 | End: 2024-06-06

## 2024-06-06 RX ORDER — ACETAMINOPHEN 500 MG
1000 TABLET ORAL ONCE AS NEEDED
Status: DISCONTINUED | OUTPATIENT
Start: 2024-06-06 | End: 2024-06-06

## 2024-06-06 RX ADMIN — ONDANSETRON 4 MG: 2 INJECTION INTRAMUSCULAR; INTRAVENOUS at 07:46:00

## 2024-06-06 RX ADMIN — DEXAMETHASONE SODIUM PHOSPHATE 8 MG: 4 MG/ML VIAL (ML) INJECTION at 07:46:00

## 2024-06-06 RX ADMIN — EPHEDRINE SULFATE 10 MG: 50 INJECTION INTRAVENOUS at 07:54:00

## 2024-06-06 RX ADMIN — LIDOCAINE HYDROCHLORIDE 50 MG: 10 INJECTION, SOLUTION EPIDURAL; INFILTRATION; INTRACAUDAL; PERINEURAL at 07:34:00

## 2024-06-06 RX ADMIN — SODIUM CHLORIDE, SODIUM LACTATE, POTASSIUM CHLORIDE, CALCIUM CHLORIDE: 600; 310; 30; 20 INJECTION, SOLUTION INTRAVENOUS at 07:30:00

## 2024-06-06 RX ADMIN — EPHEDRINE SULFATE 10 MG: 50 INJECTION INTRAVENOUS at 07:50:00

## 2024-06-06 RX ADMIN — SODIUM CHLORIDE, SODIUM LACTATE, POTASSIUM CHLORIDE, CALCIUM CHLORIDE: 600; 310; 30; 20 INJECTION, SOLUTION INTRAVENOUS at 08:57:00

## 2024-06-06 NOTE — ANESTHESIA PROCEDURE NOTES
Airway  Date/Time: 6/6/2024 7:35 AM  Urgency: elective      General Information and Staff    Patient location during procedure: OR  Anesthesiologist: Chelsi Palma MD  Resident/CRNA: Arsalan Delgado CRNA  Performed: CRNA   Performed by: Arsalan Delgado CRNA  Authorized by: Chelsi Palma MD      Indications and Patient Condition  Indications for airway management: anesthesia  Sedation level: deep  Preoxygenated: yes  Patient position: sniffing  Mask difficulty assessment: 1 - vent by mask    Final Airway Details  Final airway type: supraglottic airway      Successful airway: classic  Size 3       Number of attempts at approach: 1

## 2024-06-06 NOTE — H&P
History and Physical     Amanda Suggs Patient Status:  Hospital Outpatient Surgery    10/21/1975 MRN [id number]   Location [facility] PERIOPERATIVE Attending Danny Asher MD   Hosp Day # 0 PCP Ellen Wellington MD     Chief Complaint: L breast fibroepithelial lesion    Subjective:    Ms. Amanda Suggs is a 48 year old woman with history of hypertension who presents for evaluation of left breast fibroepithelial lesion.      About 2 months ago she self palpated a left breast lump.  She underwent screening mammogram on 2024 that showed a focal asymmetry in the left breast.  It also showed density D breasts.  On 2024 she underwent diagnostic imaging that showed a lobulated mass at the 1 o'clock position 4 cm from the nipple.  It measured 3 x 4.5 x 2.2 cm.  She underwent biopsy on 2024, a coil clip was placed.  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 The differential diagnosis includes cellular fibroadenoma versus phyllodes tumor. Saba  was placed . The first Saba did not signal therefore a second marker was placed.     History/Other:      Past Medical History:  Past Medical History:    High blood pressure        Past Surgical History:   Past Surgical History:   Procedure Laterality Date    Cyst aspiration left Left 2016    benign       Social History:  reports that she has never smoked. She has never used smokeless tobacco. She reports that she does not drink alcohol and does not use drugs.    Family History:   Family History   Problem Relation Age of Onset    Breast Cancer Mother 60    Breast Cancer Maternal Aunt 60       Allergies: No Known Allergies    Medications:    No current facility-administered medications on file prior to encounter.     Current Outpatient Medications on File Prior to Encounter   Medication Sig Dispense Refill    sertraline 25 MG Oral Tab Take 1 tablet (25 mg total) by mouth daily.      NON FORMULARY Diaxinol- for blood  sugar control      spironolactone 25 MG Oral Tab Take 1 tablet (25 mg total) by mouth daily.      metoprolol succinate ER 50 MG Oral Tablet 24 Hr Take 1 tablet (50 mg total) by mouth daily.      acetaminophen 325 MG Oral Tab Take 2 tablets (650 mg total) by mouth.         Review of Systems:   A comprehensive 14 point review of systems was completed.    Pertinent positives and negatives noted in the HPI.    Objective:     /84 (BP Location: Right arm)   Pulse 71   Temp 98.7 °F (37.1 °C) (Oral)   Resp 18   Ht 1.6 m (5' 3\")   Wt 67 kg (147 lb 12.8 oz)   LMP 05/15/2024 (Approximate)   SpO2 97%   BMI 26.18 kg/m²     General: No acute distress  HEENT: Normocephalic atraumatic. Moist mucous membranes  Neck: Supple  Respiratory: Nonlabored breathing  Cardiovascular: Regular rate and rhythm  Breast: Exam unchanged from previous consultation   Abdomen: Soft, nontender, nondistended  Neurologic: No focal neurological deficits  Musculoskeletal: Moves all extremities  Extremities: No edema or cyanosis  Psychiatric: Appropriate mood and affect  Integument: No rashes or lesions      Results:    Labs:  Selected labs - last 24 hours:  Endo  Lytes  Renal   Glu -  Na - Ca -  BUN -   POC Gluc  -  K - PO4 -  Cr -   A1c -  Cl - Mg -  eGFR -   TSH -  CO2 -         LFT  CBC  Other   AST -  WBC -  PTT - Procal -   ALT -  Hb -  INR - CRP -   APk -  Hct -  Trop - D dim -   T nikhil -  PLT -  pBNP -  BNP -  - Ferritin  -   Prot -    CK  - Lactate  -   Alb -    LDL  - COVID  -       Imaging: Imaging data reviewed in Epic.    Assessment & Plan:    Amanda Suggs is a 48 year old female with left breast fibroepithelial lesion    The risks, benefits, and alternatives were discussed including, but not limited to, seroma development, infection, and bleeding. We also discussed the possibility for upstaging of pathology which would require further surgery on another day. We discussed pathology results would be available in 5-7 business  days.    Plan: L breast sanjuanita localized excisional biopsy    Danny Asher MD  Breast Surgical Oncology

## 2024-06-06 NOTE — ANESTHESIA PREPROCEDURE EVALUATION
PRE-OP EVALUATION    Patient Name: Amanda Suggs    Admit Diagnosis: Mass of upper outer quadrant of left breast [N63.21]    Pre-op Diagnosis: Mass of upper outer quadrant of left breast [N63.21]    Left breast Saba localized excisional Biopsy    Anesthesia Procedure: Left breast Saba localized excisional Biopsy (Left)    Surgeons and Role:     * Danny Asher MD - Primary    Pre-op vitals reviewed.  Temp: 98.7 °F (37.1 °C)  Pulse: 71  Resp: 18  BP: 120/84  SpO2: 97 %  Body mass index is 26.18 kg/m².    Current medications reviewed.  Hospital Medications:   acetaminophen (Tylenol Extra Strength) tab 1,000 mg  1,000 mg Oral Once    scopolamine (Transderm-Scop) 1 MG/3DAYS patch 1 patch  1 patch Transdermal Once    lactated ringers infusion   Intravenous Continuous    [COMPLETED] heparin (Porcine) 5000 UNIT/ML injection 5,000 Units  5,000 Units Subcutaneous Once    ceFAZolin (Ancef) 2g in 10mL IV syringe premix  2 g Intravenous Once       Outpatient Medications:     Medications Prior to Admission   Medication Sig Dispense Refill Last Dose    sertraline 25 MG Oral Tab Take 1 tablet (25 mg total) by mouth daily.   6/6/2024 at 0500    NON FORMULARY Diaxinol- for blood sugar control       spironolactone 25 MG Oral Tab Take 1 tablet (25 mg total) by mouth daily.   6/6/2024 at 0500    metoprolol succinate ER 50 MG Oral Tablet 24 Hr Take 1 tablet (50 mg total) by mouth daily.   6/6/2024 at 0500    acetaminophen 325 MG Oral Tab Take 2 tablets (650 mg total) by mouth.          Allergies: Patient has no known allergies.      Anesthesia Evaluation    Patient summary reviewed.    Anesthetic Complications  (-) history of anesthetic complications         GI/Hepatic/Renal    Negative GI/hepatic/renal ROS.                             Cardiovascular                  (+) hypertension                       (-) angina     (-) MONTELONOG         Endo/Other    Negative endo/other ROS.                              Pulmonary    Negative pulmonary  ROS.                       Neuro/Psych    Negative neuro/psych ROS.                                  Past Surgical History:   Procedure Laterality Date    Cyst aspiration left Left 11/14/2016    benign     Social History     Socioeconomic History    Marital status: Life Partner   Tobacco Use    Smoking status: Never    Smokeless tobacco: Never   Vaping Use    Vaping status: Never Used   Substance and Sexual Activity    Alcohol use: Never    Drug use: Never     History   Drug Use Unknown     Available pre-op labs reviewed.     Lab Results   Component Value Date     05/23/2024    K 3.6 05/23/2024     05/23/2024    CO2 26.0 05/23/2024    BUN 11 05/23/2024    CREATSERUM 0.64 05/23/2024    GLU 96 05/23/2024    CA 9.4 05/23/2024            Airway      Mallampati: II  Mouth opening: >3 FB  TM distance: > 6 cm  Neck ROM: full Cardiovascular    Cardiovascular exam normal.         Dental    Dentition appears grossly intact         Pulmonary    Pulmonary exam normal.                 Other findings              ASA: 2   Plan: general  NPO status verified and patient meets guidelines.    Post-procedure pain management plan discussed with surgeon and patient.    Comment: Discussed risks including PONV, sore throat, dental damage, cardiac and respiratory complications. All questions answered.  Plan/risks discussed with: patient  Use of blood product(s) discussed with: patient              Present on Admission:  **None**

## 2024-06-06 NOTE — ANESTHESIA POSTPROCEDURE EVALUATION
[facility] Patient Status:  Hospital Outpatient Surgery   Age/Gender 48 year old female MRN [id number]   Location [facility] SURGERY Attending Danny Asher MD   Hosp Day # 0 PCP Ellen Wellington MD       Anesthesia Post-op Note    Left breast Saba localized excisional Biopsy    Procedure Summary       Date: 06/06/24 Room / Location:  MAIN OR [address] [place] [address] MAIN OR    Anesthesia Start: 0729 Anesthesia Stop: 0900    Procedure: Left breast Saba localized excisional Biopsy (Left: Breast) Diagnosis:       Mass of upper outer quadrant of left breast      (Mass of upper outer quadrant of left breast [N63.21])    Surgeons: Danny Asher MD Anesthesiologist: Chlesi Palma MD    Anesthesia Type: general ASA Status: 2            Anesthesia Type: No value filed.    Vitals Value Taken Time   /69 06/06/24 0905   Temp 97.5 06/06/24 0905   Pulse 87 06/06/24 0905   Resp 18 06/06/24 0905   SpO2 100% 06/06/24 0905       Patient Location: Same Day Surgery    Anesthesia Type: general    Airway Patency: patent    Postop Pain Control: adequate    Mental Status: mildly sedated but able to meaningfully participate in the post-anesthesia evaluation    Nausea/Vomiting: none    Cardiopulmonary/Hydration status: stable euvolemic    Complications: no apparent anesthesia related complications    Postop vital signs: stable    Dental Exam: Unchanged from Preop    Patient to be discharged home when criteria met.

## 2024-06-06 NOTE — DISCHARGE INSTRUCTIONS
Breast Surgery  Post-operative Instructions  Excisional Biopsy    Danny Asher MD  General Instructions  The following instructions will provide helpful information that will assist your recovery. These are designed to be general guidelines. Please remember that everyone heals and recovers differently. Listen to your body and rest when you are tired. If you have any questions or concerns, please do not hesitate to contact my office. I would like to see you in the office about one week after surgery - usually you already have an appointment made before the surgery takes place. If not, please schedule and appointment through my office: [phone].    Restrictions  No lifting anything greater than a gallon of milk (>10 lbs) for 1 week after surgery. After that, you may gradually increase the amount of weight based on your comfort level. You should avoid a lot of repetitious activity with the arm until the wound is well-healed (about two weeks).   You should not drive a car until you believe you can react to an emergency situation.   You may shower the day after surgery. You should not bathe or swim (i.e. submerge wound) until the wound is well healed (about 2-4 weeks).  There are no dietary restrictions.    Wound Care  You may shower on the day after surgery. Under the gauze, you will find white steri-strips. You should leave the steri-strips in place; they will start to peel off about 10 days after your surgery. The stitches are all underneath the skin and will dissolve on their own. You will not need any stitches removed.  Let soapy water run over the incision, don't scrub the skin. Pat dry following the shower and you cover the area with gauze as needed for comfort.   You can keep a gauze dressing on the wound until the wound is completely dry and without drainage-usually 1-3 days.   If a surgical bra was placed after the surgery, I encourage you to wear it as much as possible during the week following the  procedure (including during sleep). You can also use extra gauze against the area of your surgery to help with extra compression. Compression will help avoid fluid retention and fluid collection. Alternatively, you may choose to wear your own bra provided it is comfortable, provides support and does not have an underwire. If the breast doesn't move it is less painful.   It is normal to feel a lump in the area of the incisions for up to 6 months. This is part of the healing process. Eventually the breast will return to its normal condition.   Pain Medication  We recommend you mainly use alternating Tylenol/acetaminophen or Motrin/ibuprofen/Advil for pain control. We recommend 1000 mg Tylenol every 6 hours (do not exceed 4000 mg Tylenol in any 24 hour period) and ibuprofen 600 mg every 6 hours.   Using an ice pack for 10-20 minutes at a time over the incision can also alleviate pain.     Pathology Report  The Pathology report is usually available 5-7 business days following the surgery. I will call you or send you a [employer] message with the results once the report is available to me.    Notify my office if:   Your temperature is over 101.5 F   You notice increasing swelling, redness, warmth or drainage from around the incision or drain site.    If you experience any problems, please call my office and either my nurse or myself will respond. After hours, you will be forwarded to my answering service which will help you get in touch with myself or the physician covering for me.  Office: [phone]

## 2024-06-06 NOTE — BRIEF OP NOTE
Pre-Operative Diagnosis: Mass of upper outer quadrant of left breast [N63.21]     Post-Operative Diagnosis: Mass of upper outer quadrant of left breast [N63.21]      Procedure Performed:   Left breast Saba localized excisional Biopsy    Surgeons and Role:     * Danny Asher MD - Primary    Assistant(s):  Surgical Assistant.: Lillie El     Surgical Findings: Intra-op xray showed 2 saba scouts and coil clip     Specimen: left breast mass, no orientation     Estimated Blood Loss: Blood Output: 5 mL (6/6/2024  8:38 AM)        Danny Asher MD  6/6/2024  8:40 AM

## 2024-06-06 NOTE — OPERATIVE REPORT
[facility]   part of [facility]    Operative Note         Amanda Suggs Location: OR   [facility] [id number] MRN [id number]   Admission Date 6/6/2024 Operation Date 6/6/2024   Attending Physician Danny Asher MD       Patient Name: Amanda Suggs     Preoperative Diagnosis: Mass of upper outer quadrant of left breast [N63.21]     Postoperative Diagnosis: Mass of upper outer quadrant of left breast [N63.21]     Procedure(s):  Left breast Sanjuanita localized excisional Biopsy     Primary Surgeon: Danny Asher MD     Surgical Assistant.: Lillie El     Anesthesia: General     Specimen:   ID Type Source Tests Collected by Time Destination   1 : LEFT breast mass Breast tissue Breast, left SURGICAL PATHOLOGY TISSUE Danny Asher MD 6/6/2024  8:32 AM         Estimated Blood Loss: Blood Output: 5 mL (6/6/2024  8:38 AM)       Complications: none     Indications for procedure: Pathology showed a fibroepithelial lesion with cellular stroma. The differential diagnosis includes cellular fibroadenoma versus phyllodes tumor.        Operative Summary:  The patient was brought to the operating room and LMA was initiated by anesthesia team. Sanjuanita  was tested and showed an appropriate signal. The patient was prepped and draped in the usual fashion. Time out was performed, left side was confirmed as the correct side. Local anesthetic (lidocaine with epi) was infiltrated at the proposed incision site and the periareolar skin incision was made. Skin flaps were raised toward the lesion, confirmed by Sanjuanita . It was located 2:00, about 3 cm from the nipple. After circumferential skin flaps were raised, the lesion was identified by Sanjuanita. The mass was then removed from the remaining margins of the breast. The mass was removed from the breast and but not oriented. Specimen xray was completed and showed 2 sanjuanita scouts and coil clip present (during loc procedure, initial sanjuanita did not provide audible signal and second had to be  placed). The surgical bed was irrigated and more local anesthetic was infiltrated. Hemostasis was obtained. The deep dermis was closed with 3-0 vicryl and the skin was closed with 4-0 monocryl. The incision was dressed with Mastisol, steristrips, gauze, fluffs, and a bra. The patient was taken to recovery in stable condition.     Assistance from a surgical assistant was necessary for optimal visualization during the case.      Drains: none     Condition: stable    Plan:   DC home   Over the counter pain medication       Danny Asher MD

## 2024-06-18 ENCOUNTER — OFFICE VISIT (OUTPATIENT)
Dept: SURGERY | Facility: CLINIC | Age: 49
End: 2024-06-18

## 2024-06-18 VITALS
RESPIRATION RATE: 16 BRPM | TEMPERATURE: 98 F | OXYGEN SATURATION: 98 % | SYSTOLIC BLOOD PRESSURE: 130 MMHG | HEART RATE: 74 BPM | DIASTOLIC BLOOD PRESSURE: 84 MMHG

## 2024-06-18 DIAGNOSIS — D24.2 BENIGN PHYLLODES TUMOR OF BREAST, LEFT: Primary | ICD-10-CM

## 2024-06-18 DIAGNOSIS — R92.343 EXTREMELY DENSE TISSUE OF BOTH BREASTS ON MAMMOGRAPHY: ICD-10-CM

## 2024-06-18 PROCEDURE — 99024 POSTOP FOLLOW-UP VISIT: CPT | Performed by: SURGERY

## 2024-06-18 NOTE — PROGRESS NOTES
Breast Surgery Postop Follow up     This is a 48 year old patient who presents for follow up s/p left breast excisional biopsy on 6/6.     History of Present Illness:   Amanda Suggs is a 48 year old woman who presented with L breast fibroepithelial lesion now s/p excisional biopsy on 6/6. 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 The lesion focally extended to the inked margin.   Her incisions are healing well, she has been keeping them clean and dry. Her pain has been under control. She denies shortness of breath, nausea/vomiting, constipation/diarrhea.      Past medical history, surgical history, family history, allergies, and social history were updated as applicable in EPIC, otherwise see prior consultation note.    Review of Systems   Constitutional:  Negative for chills and fever.   HENT:  Negative for sore throat and trouble swallowing.    Eyes:  Negative for visual disturbance.   Respiratory:  Negative for cough, chest tightness and shortness of breath.    Cardiovascular:  Negative for chest pain, palpitations and leg swelling.   Gastrointestinal:  Negative for nausea, vomiting, abdominal pain, diarrhea, constipation and blood in stool.   Genitourinary:  Negative for dysuria, hematuria and difficulty urinating.   Skin:  Negative for color change and rash.   Neurological:  Negative for numbness and headaches.        Physical Exam  Vitals reviewed.   Constitutional:       Appearance: Normal appearance.   HENT:      Head: Normocephalic and atraumatic.   Eyes:      Extraocular Movements: Extraocular movements intact.      Pupils: Pupils are equal, round, and reactive to light.   Cardiovascular:      Rate and Rhythm: Normal rate.   Pulmonary:      Effort: Pulmonary effort is normal.   Chest:   Breasts:     Right: Normal. No mass, nipple discharge, skin change or tenderness.      Left: Normal. No mass, nipple discharge, skin change or tenderness.          Comments: Incision healing, steris  still in place  Seroma in surgical cavity without tenderness or erythema  Abdominal:      General: Abdomen is flat.      Palpations: Abdomen is soft.   Musculoskeletal:         General: Normal range of motion.      Cervical back: Normal range of motion and neck supple.   Lymphadenopathy:      Upper Body:      Right upper body: No supraclavicular or axillary adenopathy.      Left upper body: No supraclavicular or axillary adenopathy.   Skin:     General: Skin is warm and dry.   Neurological:      General: No focal deficit present.      Mental Status: She is alert and oriented to person, place, and time.   Psychiatric:         Mood and Affect: Mood normal.          Labs/imaging: reviewed in [facility]     Impression:   Amanda Suggs is a 48 year old woman s/p L breast excisional biopsy on 6/6 with final pathology showing benign phyllodes tumor.      Discussion and Plan:     Patient's pathology report was discussed with her. We discussed that benign phyllodes is not malignant and that the treatment is removal. As part of the lesion focally extends to the inked margin, we discussed that her risk of recurrence is a little higher than normal. We discussed that we will continue to monitor this area over time and that there may never even be a recurrence. It would not be worth while to excise more tissue at this time, especially since the margin was not oriented.     High risk breast clinic: We calculated her Tyrer Cuzick score, which revealed an estimated lifetime breast cancer risk of 36.2% and a 10-year breast cancer risk of 9.5%. This lifetime risk classifies the patient as \"high risk\" for breast cancer. She has density D breast tissue.     Further screening:   Annual screening mammogram in April 2025 (ordered)  Annual screening MRI due to high risk and dense breast tissue in October 2024 (ordered)  Genetics scheduling number provided as patient is interested    Return to clinic: 1 year for high risk     Danny Asher  MD  Breast Surgical Oncology    Copy: Dr. Unger

## 2024-06-18 NOTE — PATIENT INSTRUCTIONS
Annual screening mammogram in April 2025 (ordered)  Annual screening MRI due to high risk and dense breast tissue in October 2024 (ordered)  If interested in genetic testing, please call [phone] to make an appointment at your convenience

## 2024-09-13 ENCOUNTER — TELEPHONE (OUTPATIENT)
Dept: INTERNAL MEDICINE CLINIC | Facility: HOSPITAL | Age: 49
End: 2024-09-13

## 2024-09-13 ENCOUNTER — LAB ENCOUNTER (OUTPATIENT)
Dept: LAB | Facility: HOSPITAL | Age: 49
End: 2024-09-13
Attending: PREVENTIVE MEDICINE

## 2024-09-13 DIAGNOSIS — Z20.822 SUSPECTED COVID-19 VIRUS INFECTION: ICD-10-CM

## 2024-09-13 DIAGNOSIS — Z20.822 SUSPECTED COVID-19 VIRUS INFECTION: Primary | ICD-10-CM

## 2024-09-13 LAB — SARS-COV-2 RNA RESP QL NAA+PROBE: DETECTED

## 2024-09-13 NOTE — TELEPHONE ENCOUNTER
[x] EH  []TANISHA   [] [facility]  Manager : Xiomy Zaratetabatha    [x] Direct Patient Care  []Indirect Patient Contact   [] Non-Clinical/No Patient Contact    For Direct Patient Care ONLY: Have you been fitted with an N95 mask within the last 12 months? [x] Yes  []No      High Risk Area:    [] Yes   [x] No    HAVE YOU RECEIVED THE COVID-19 Vaccine? Yes [x]    No []          If yes, date(s) received:   1/22/21; 2/12/21         Which vaccine:  Pfizer [x]     Moderna []    J&J []      SYMPTOMS (reported via dashboard):  [] asymptomatic  [x] symptomatic  [] GI symptoms only    Symptom onset date: 9/12  Fever   > 100F             Yes []      Cough                          Yes [x]      Shortness of breath  Yes []      Congestion                 Yes [x]      Runny nose                Yes [x]        Loss of Smell              Yes []        Loss of Taste             Yes []       Sore throat                 Yes [x]       Fatigue                        Yes [x]       Body Aches                Yes [x]        Chills                           Yes [x]        Headache                   Yes [x]             GI symptoms             Yes []     No [x]                     Nausea   []          Vomiting            []                                    Diarrhea  []          Upset stomach []      Employee reported COVID Exposure?  Yes [x]     No []    Date of exposure: 9/11  [x]  Coworker                       [] patient                        [] Family/friend    PPE:   [] N95 Mask/PAPR  [] Standard Mask  [] Eyewear  [x] None    Within 6 feet for >15 minutes? [] Yes [x]  No    Is this a true exposure? []  Yes [x]  No    When was the last shift you worked?: 9/11    Employee has a history of Covid?  Yes [x]     No []   If Yes, when: 2022    Employee is immunocompromised?  Yes []     No [x]      PLAN:     COVID-19 testing ordered:   [x] Rapid      [] Alinity              Date test is to be taken:   9/13    []  No testing required at this time  []  Outside testing                            Notes:    INSTRUCTIONS PROVIDED:    [x]  Employee was instructed to call Central scheduling at [phone] or use [email or web address] (remove any insurance information listed) to make an appointment for their testing   [x]  May return to work if employee views negative result in MyChart and remains fever, vomiting, and diarrhea free  []  May continue to work if remains asymptomatic and views negative result in MyChart  []  Follow up for condition update when resulting  []  If symptoms develop, stay home and call hotline for rapid test order  []  If COVID positive results, off work minimum of 5 days from positive test or onset of symptoms (day 0)     [x]  Plan noted above  [x]  Length of time to obtain results  []  Quarantine instructions  []  S/S of worsening infection/condition and importance of prompt medical re-evaluation including when to seek emergency care.   [x] The employee voiced understanding

## 2024-09-14 NOTE — TELEPHONE ENCOUNTER
Results and RTW guidelines:    COVID RESULT:    [x] Viewed by employee in [employer].  RTW plan and instructions as indicated on triage call.  Manager notified.  Estimated RTW date:   [x] Discussed with employee   [] Unable to reach by phone.  Sent via [employer] message      Test type:    [x] Rapid         [] Alinity         [] Outside test:     [x] Positive     - Employee should quarantine at home for at least 5 days (day 1 is day after sx onset) , follow the CDC guidelines for cleaning and                              quarantining; see CDC.gov   -This employee may RTW on day 6 if asymptomatic or mildly symptomatic (with improving symptoms).  Call Employee Health on day 5 if unable to return on day 6 after                      symptom onset.    -This employee needs to call Employee Health on day 5 after symptom onset.  The employee needs to be cleared by Employee Health.  - If employee is still experiencing severe symptoms on day 5 must make a RTW appt with Employee Health, Employee will not be cleared if:    1. Has consistent cough, shortness of breath or fatigue that restricts your physical activities    2. Is still feeling \"unwell\"    3. Within 15 days of hospitalization for COVID    4. Within 20 days of intubation for COVID    5. Still has a fever, vomiting or diarrhea   - Keep communication open with management about RTW and if symptoms worsen  - Monitor symptoms and temperature                 - Notify PCP of result                 - Seek emergent care with worsening symptoms                - If outside testing completed, bring a copy of result to RTW appointment           Notes:     RTW PLAN:    [x]  If COVID positive results, off work minimum of 5 days from positive test or onset of symptoms (day 0)        On day 5, if asymptomatic or mildly symptomatic (with improving symptoms) may return to work day 6          On day 5, if symptomatic, call Employee Health for RTW screening        []  COVID positive result -  call Employee Health on day 5 after symptom onset.  The employee needs to be cleared by Employee Health to RTW.  [] RTW immediately, continue to monitor for sx  [] RTW when sx improve; must be fever free for 24 hours w/o medications, Diarrhea/Vomiting free for 24 hours w/o medications  [] Alinity ordered; continue to monitor sx and call for new/worsening sx.  Discuss RTW guidelines with manager  [] May continue to work  [] Follow up with PCP  [] Home until further instruction from hotline with Alinity results  INSTRUCTIONS PROVIDED:  [x]  Plan as noted above  []  Length of time to obtain results   [x]  Quarantine instructions  [x]  Masking protocol   [x]  S/S of worsening infection/condition and importance of prompt medical re-evaluation including when to seek emergency care  [] If symptoms develop, stay home and call hotline for rapid test order    Estimated RTW date:  9/18    [x] The employee voiced understanding of above plan/instructions  [x] Manager Notified

## 2024-10-26 ENCOUNTER — OFFICE VISIT (OUTPATIENT)
Dept: FAMILY MEDICINE CLINIC | Facility: CLINIC | Age: 49
End: 2024-10-26
Payer: COMMERCIAL

## 2024-10-26 VITALS
DIASTOLIC BLOOD PRESSURE: 82 MMHG | BODY MASS INDEX: 27 KG/M2 | SYSTOLIC BLOOD PRESSURE: 126 MMHG | WEIGHT: 155.19 LBS | HEART RATE: 81 BPM | OXYGEN SATURATION: 99 % | TEMPERATURE: 99 F

## 2024-10-26 DIAGNOSIS — H61.21 IMPACTED CERUMEN OF RIGHT EAR: Primary | ICD-10-CM

## 2024-10-26 PROCEDURE — 69209 REMOVE IMPACTED EAR WAX UNI: CPT | Performed by: NURSE PRACTITIONER

## 2024-10-26 NOTE — PROGRESS NOTES
Patient presents with:    Request for Ear Wax Removal    HPI: Patient presents for removal of ear wax. Has ongoing issue with cerumen build up. Home treatments tried: debrox ear drops. Reports diminished hearing, itching.  Denies ear pain, fever, chills.    Cerumen Removal Procedure  Patient gave verbal consent.   Risks and Benefits of removal were discussed with the patient. Pt agreed to proceed with procedure.    Location: Right ear  Indication: TM not visible, local itching, fullness.  Prep: Hydrogen Peroxide with warm water via syringe  Removal: Otoscope visualization of cerumen and ear lavage were used to remove the wax  Patient Status: Tolerated well; No complications      EXAM: Prior to cerumen removal:  pos EAC with cerumen impaction; pos right TM not visible.  No tragal tenderness on palpation.  After removal: EACs healthy and without lesions. TMs healthy, no injection, fluid, retraction.     Assessment:  Cerumen impaction of right ear    Plan: Successful removal with ear lavage. Patient tolerated without complications.    Discussed prevention of impaction.     F/U as needed.

## 2024-12-11 ENCOUNTER — OFFICE VISIT (OUTPATIENT)
Facility: CLINIC | Age: 49
End: 2024-12-11
Payer: COMMERCIAL

## 2024-12-11 VITALS
WEIGHT: 153 LBS | OXYGEN SATURATION: 98 % | DIASTOLIC BLOOD PRESSURE: 78 MMHG | HEART RATE: 83 BPM | RESPIRATION RATE: 16 BRPM | HEIGHT: 63 IN | SYSTOLIC BLOOD PRESSURE: 124 MMHG | BODY MASS INDEX: 27.11 KG/M2

## 2024-12-11 DIAGNOSIS — I10 HYPERTENSION, UNSPECIFIED TYPE: ICD-10-CM

## 2024-12-11 DIAGNOSIS — E66.3 OVERWEIGHT (BMI 25.0-29.9): ICD-10-CM

## 2024-12-11 DIAGNOSIS — Z51.81 ENCOUNTER FOR THERAPEUTIC DRUG LEVEL MONITORING: Primary | ICD-10-CM

## 2024-12-11 DIAGNOSIS — R73.01 IFG (IMPAIRED FASTING GLUCOSE): ICD-10-CM

## 2024-12-11 DIAGNOSIS — E78.5 HYPERLIPIDEMIA, UNSPECIFIED HYPERLIPIDEMIA TYPE: ICD-10-CM

## 2024-12-11 PROCEDURE — 99204 OFFICE O/P NEW MOD 45 MIN: CPT | Performed by: PHYSICIAN ASSISTANT

## 2024-12-11 RX ORDER — PHENTERMINE HYDROCHLORIDE 15 MG/1
15 CAPSULE ORAL EVERY MORNING
Qty: 30 CAPSULE | Refills: 2 | Status: SHIPPED | OUTPATIENT
Start: 2024-12-11

## 2024-12-11 NOTE — PATIENT INSTRUCTIONS
1200 calories a day    Moderate Carb (30/35/35)  115g  Protein    60g  Fats    134g  Carbs    Lower Carb (40/40/20)  153g  Protein    68g  Fats    77g  Carbs    We are here to support you with weight loss, but please remember that you still need your primary care provider for your routine health maintenance.      PLAN:  Begin phentermine  Follow up with me in 4 months  Schedule follow up appointments: Jovanni Posada (dietitian) or Glenny Saul (presurgery dietitian)   Check for insurance coverage for dietitian and labwork prior to scheduling appointment.     Please try to work on the following dietary changes:  Goals: Aim for 20-30 grams of protein/ meal  Eat 4-6 vegetables/day  Avoid skipping meals- eat every 4-5 hours  Aim for 3 meals/day  2. Drink lots of water and cut down on soda/juice consumption if soda/juice drinker  3. Focus on protein: (15-30 grams with each meal) ie. greek yogurt, cottage cheese, string cheese, hard boiled eggs  4. Healthy snacks: peanut butter and apples, hummus and carrots, berries, nuts (1/4 cup), tuna and crackers                 Protein Shakes: Premier protein or Core Power                Protein Bars: Rx Bars, Oatmega, Power Crunch                 Sargento balanced breaks (cheese and nuts)- without chocolate  5. Reduce carbohydrates which includes sweets as well as rice, pasta, potatoes, bread, corn and instead choose whole grain options or more protein or vegetables (4-6 servings of vegetables per day)  6. Get a good night of sleep  7. Try to decrease stress in life     Please download apps:  1. My Fitness Pal, Lose it, My Net Diary vaughn to help you to monitor daily dietary intake and you will be able to see if you are eating the right amount of calories, protein, carbs                With My Fitness Pal-->When you set-up the vaughn or need to adjust settings:                Goals should include:                 Lose 1.5-2 lbs per week                Activity level: not very active (can't  count exercise towards calorie number per day)                   ** Daily INPUT> Look at nutrition section-- \"nutrients\" and it will break down your macros for the day (ie. Protein, carbs, fibers, sugars and fats). Try to stay within these numbers daily     2. \"7 minute workout\" to help with exercise/activity which takes 7 minutes of your day and that you can do at home!   3. \"Calm\" or \"Headspace\" which helps with mindfulness, meditation, clarity, sleep, and niko to your daily life.   4. [employer] blog for healthy recipe ideas  5. igobubble for low carb resources    HIGH PROTEIN SNACK IDEAS  -cottage cheese  -plain yogurt  -kefir  -hard-boiled eggs  -natural cheeses  -nuts (measure portion size)   -unsweetened nut butters  -dried edamame   -elkin seeds soaked in water or almond milk  -soy nuts  -cured meats (monitor for sodium issues)   -hummus with vegetables  -bean dip with vegetables     FRUIT  Low carb fruit options   Raspberries: Half a cup (60 grams) contains 3 grams of carbs.  Blackberries: Half a cup (70 grams) contains 4 grams of carbs.  Strawberries: Half a cup (100 grams) contains 6 grams of carbs.  Blueberries: Half a cup (50 grams) contains 6 grams of carbs.  Plum: One medium-sized (80 grams) contains 6 grams of carbs.     VEGETABLES  Low carb vegetables              Delicious and Healthy Snacks     Tomato and cheese plate--mix 1/2 cup   cherry tomatoes, 1 cup fresh mini mozzarella   balls, ½ cup olives     Cottage cheese & berries--top ½ cup of   cottage cheese with 1 cup of berries of   choice. Add one handful of pecans for some   extra protein, fat and fiber.      Apple + Greek Yogurt + Nut butter*--cut up   an apple and dip in 1/3 cup of Greek yogurt   and 2 tbsp of nut butter.     Hummus & veggies--dip baby carrots, pepper   strips, or snap peas in ¼ cup hummus.     Nuts--munch on 1oz of any kind of nut (about   ¼ cup). In the shell will help to slow down!   Carrots and cheese plate-- 1 cup of  baby   carrots (or veg of choice) with ½ cup cheddar   cheese cubes and 2-4 low sodium, nitrate   free turkey slices     Yogurt & berries--mix ½-1 cup berries in a   6oz container of plain or low sugar fruit   flavored 2% Greek yogurt (less than15 grams   of sugar per serving). Chobani (Less Sugar) or   Siggis are examples.     Hard boiled egg & fruit--1 to 2 hardboiled   eggs and a tangerine or other small serving   of fruit     Tuna & avocado--put 2oz of tuna (one pouch)   on 1/3 of an avocado or 2 tablespoons   guacamole and top with salsa     String cheese & veggies--one piece cheese   (¾ oz) and 1 cup snap peas or other   vegetables     Cauliflower rice & cheese--sprinkle ¼ cup   shredded cheese on 1 cup cauliflower rice   and microwave until cheese melts     Deviled eggs--3 deviled egg halves     Bean dip & veggies- ½ cup refried beans   with ¼ cup shredded cheese melted on   top. Use as a dip for celery or red pepper   strips or eat plain.     Sargento Balanced Breaks-or make your   own with ½ oz nuts, ½ oz cheese, and 1   teaspoon dried fruit.     Edamame with fruit and nuts--1-2   mandarin orange, ¼ cup cashews, ¼ cup   edamame      Low fat chicken, egg, or tuna salad--mix   2oz chicken, tuna, or with 1 teaspoon   mayonnaise, 1 teaspoon Vinicius mustard,   and 1 teaspoon plain yogurt. Add chopped   celery, onions, walnuts, Granny Smith   apples, or dried cranberries for extra flavor.     Robson break--turkey, chicken, or nut   butter on 1 slice whole grain bread.     Protein Shake--Quintana, Core Power, Orgain,   Premier Protein, Fairlife     Protein Bar--Quest, Oatmega, RX Bar, Calhan   Bars     Protein Chips - Legendary and Quest chips  These snacks contain protein,   fiber and fat to keep you full and   energized!   *If you have a peanut allergy, you can substitute with   Sun Butter (made from sunflower seeds) or WOW butter   (made from soy

## 2024-12-11 NOTE — PROGRESS NOTES
HISTORY OF PRESENT ILLNESS  Chief Complaint   Patient presents with    Weight Problem     Ref by coworker,no prior [facility] management, open to meds       Amanda Suggs is a 49 year old female new to our office today for initiation of medical weight loss program.  Patient presents today with c/o excess weight.       Used to be around 125lbs  Over the summer was in school and more sedentary and feels like that is when she noticed more weight gain    Gained after having kids, but was then able to lose it    Reason/goal for weight loss: would like to lose 20lbs    Previous weight loss efforts in the past/medication: weight loss shakes    Interest in Bariatric surgery:     Barriers to weight loss:     Wt Readings from Last 6 Encounters:   12/11/24 153 lb (69.4 kg)   10/26/24 155 lb 3.2 oz (70.4 kg)   06/06/24 147 lb 12.8 oz (67 kg)   05/21/24 149 lb (67.6 kg)   05/11/23 141 lb (64 kg)   02/23/20 142 lb 9.6 oz (64.7 kg)          Breakfast Lunch Dinner Snacks Fluids   Coffee, toast w/jelly, yogurt.  Salad w/some meat or fish, sandwiches.  Meal w/sides  Nuts, bananas, crackers, mix  Water, tea, seltzer water, Celsius drink      Social hx and lifestyle reviewed:    Work: MA  Marital status: BF  Support:   Tobacco use: none  ETOH use: 1 a week  Supplements: MV  Exercise: on her feet all day at work, hiking on the weekends  Stress level: 7/10  Sleep hours and integrity: 5-6hrs a night    MEDICAL HISTORY  PMH reviewed:   Cardiac disorders:negative    Depression/anxiety: Yes  Glaucoma: negative   Kidney stones: negative   Eating disorder: negative   Migraines: negative   Seizures: negative   Joint-related conditions: feet, hands, elbows  Liver disease: negative   Renal disease: negative   Diabetes: IFG  Thyroid disease: negative   Constipation: negative  Miscarriage: negative   DVT: negative    Family or personal history of Pancreatic issues / Medullary Thyroid Cancer: negative    History of bariatric surgery: negative     FMH  reviewed: obesity in parent/s or sibling:     REVIEW OF SYSTEMS  GENERAL: feels well otherwise  NECK: denies thickening   LUNGS: denies shortness of breath with exertion, no apnea   CARDIOVASCULAR: denies chest pain on exertion, denies palpitations or pedal edema  GI: denies abdominal pain.  No N/V/D/C  MUSCULOSKELETAL: see above  NEURO: denies headaches   PSYCH: denies change in behavior or mood, denies feeling sad or depressed     EXAM  /78   Pulse 83   Resp 16   Ht 5' 3\" (1.6 m)   Wt 153 lb (69.4 kg)   LMP 10/23/2024 (Exact Date)   SpO2 98%   BMI 27.10 kg/m² , Percent body fat: F >32%        GENERAL: well developed, well nourished, in no apparent distress  SKIN: warm, pink, dry without rashes to exposed area   EYES: conjunctiva pink, sclera non icteric, PERRL  HEENT: atraumatic, normocephalic, O/p: Mallampati score-   NECK: supple, non tender, no adenopathy, no thyromegaly  LUNGS: CTA in all fields, breathing non labored  CARDIO: RRR without murmur, normal S1 and S2 without clicks or gallops, no pedal edema. EKG not performed in office  GI: rotund, no masses, HSM or tenderness  MUSCULOSKELETAL: grossly intact   NEURO: Oriented times three, full ROM of bilateral UE/LE  PSYCH: pleasant, cooperative, normal mood and affect    No results found for: \"WBC\", \"RBC\", \"HGB\", \"HCT\", \"MCV\", \"MCH\", \"MCHC\", \"RDW\", \"PLT\", \"MPV\"  Lab Results   Component Value Date    GLU 96 05/23/2024    BUN 11 05/23/2024    CREATSERUM 0.64 05/23/2024    ANIONGAP 8 05/23/2024    CA 9.4 05/23/2024    OSMOCALC 285 05/23/2024     05/23/2024    K 3.6 05/23/2024     05/23/2024    CO2 26.0 05/23/2024     No results found for: \"EAG\", \"A1C\"  No results found for: \"CHOLEST\", \"TRIG\", \"HDL\", \"LDL\", \"VLDL\", \"TCHDLRATIO\", \"NONHDLC\", \"CHOLHDLRATIO\", \"CALCNONHDL\"  No results found for: \"T4F\", \"TSH\", \"TSHT4\"  No results found for: \"B12\", \"VITB12\"  No results found for: \"VITD\", \"QVITD\", \"MQQG24GH\"    Medications Ordered Prior to  Encounter[1]    ASSESSMENT  Initial Weight Data and Goal Weight Loss:  Weight Calculations  Initial Weight: 153 lbs  Initial Weight Date: 12/11/24  Today's Weight: 153 lbs  5% Goal: 7.65  10% Goal: 15.3  Total Weight Loss: 0 lbs    Diagnoses and all orders for this visit:    Encounter for therapeutic drug level monitoring  -     Phentermine HCl 15 MG Oral Cap; Take 1 capsule (15 mg total) by mouth every morning.    Overweight (BMI 25.0-29.9)  -     Phentermine HCl 15 MG Oral Cap; Take 1 capsule (15 mg total) by mouth every morning.    Hypertension, unspecified type    Hyperlipidemia, unspecified hyperlipidemia type    IFG (impaired fasting glucose)        PLAN  Initial Weight: 153 lbs  Initial Weight Date: 12/11/24  Today's Weight: 153 lbs  5% Goal: 7.65  10% Goal: 15.3  Total Weight Loss: 0 lbs    EKG 5/23/24 - NSR, no ST-T wave changes, normal axis  Will begin phentermine - discussed side effects including but not limited to:( elevated BP, tremor, anxiety, headache, constipation and serious side effects including arrhythmia and cad ) patient verbalized understanding agrees with the plan  HTN - blood pressure well controlled on current medication - advised signs and symptoms of hypotension and will monitor with continued weight loss  HLD - lifestyle changes  IFG - low carb diet  Mood is stable on current medication  Begin logging foods - discussed macronutrient goals  -low carb high protein  -portion control  -Limit carbohydrates  -Eat breakfast every day   -Don't skip meals   -Read nutrition labels and keep a food log  -drink a lot of water 65 oz of water per day  - Do not drink your calories (no soda, juice, high calorie coffee drinks, limit alcohol)  - Do not eat late at night  Medication use and side effects reviewed with patient.  Medication contraindications: n/a  Follow up with dietitian and psychologist as recommended.  Discussed the role of sleep and stress in weight management.  Labs orders as  above.  Counseled on comprehensive weight loss plan including attention to nutrition, exercise and behavior/stress management for success. See patient instruction below for more details.  Weight Loss Consent to treat reviewed and signed.    Total time spent on chart review, pre-charting, obtaining history, counseling, and educating, reviewing labs was 45 minutes.      Patient Instructions   1200 calories a day    Moderate Carb (30/35/35)  115g  Protein    60g  Fats    134g  Carbs    Lower Carb (40/40/20)  153g  Protein    68g  Fats    77g  Carbs    We are here to support you with weight loss, but please remember that you still need your primary care provider for your routine health maintenance.      PLAN:  Begin phentermine  Follow up with me in 4 months  Schedule follow up appointments: Jovanni Posada (dietitian) or Glenny Saul (presurgery dietitian)   Check for insurance coverage for dietitian and labwork prior to scheduling appointment.     Please try to work on the following dietary changes:  Goals: Aim for 20-30 grams of protein/ meal  Eat 4-6 vegetables/day  Avoid skipping meals- eat every 4-5 hours  Aim for 3 meals/day  2. Drink lots of water and cut down on soda/juice consumption if soda/juice drinker  3. Focus on protein: (15-30 grams with each meal) ie. greek yogurt, cottage cheese, string cheese, hard boiled eggs  4. Healthy snacks: peanut butter and apples, hummus and carrots, berries, nuts (1/4 cup), tuna and crackers                 Protein Shakes: Premier protein or Core Power                Protein Bars: Rx Bars, Oatmega, Power Crunch                 Sargento balanced breaks (cheese and nuts)- without chocolate  5. Reduce carbohydrates which includes sweets as well as rice, pasta, potatoes, bread, corn and instead choose whole grain options or more protein or vegetables (4-6 servings of vegetables per day)  6. Get a good night of sleep  7. Try to decrease stress in life     Please download apps:  1. My  Fitness Pal, Lose it, My Net Diary vaughn to help you to monitor daily dietary intake and you will be able to see if you are eating the right amount of calories, protein, carbs                With My Fitness Pal-->When you set-up the vaughn or need to adjust settings:                Goals should include:                 Lose 1.5-2 lbs per week                Activity level: not very active (can't count exercise towards calorie number per day)                   ** Daily INPUT> Look at nutrition section-- \"nutrients\" and it will break down your macros for the day (ie. Protein, carbs, fibers, sugars and fats). Try to stay within these numbers daily     2. \"7 minute workout\" to help with exercise/activity which takes 7 minutes of your day and that you can do at home!   3. \"Calm\" or \"Headspace\" which helps with mindfulness, meditation, clarity, sleep, and niko to your daily life.   4. Backupify blog for healthy recipe ideas  5. Junar for low carb resources    HIGH PROTEIN SNACK IDEAS  -cottage cheese  -plain yogurt  -kefir  -hard-boiled eggs  -natural cheeses  -nuts (measure portion size)   -unsweetened nut butters  -dried edamame   -elkin seeds soaked in water or almond milk  -soy nuts  -cured meats (monitor for sodium issues)   -hummus with vegetables  -bean dip with vegetables     FRUIT  Low carb fruit options   Raspberries: Half a cup (60 grams) contains 3 grams of carbs.  Blackberries: Half a cup (70 grams) contains 4 grams of carbs.  Strawberries: Half a cup (100 grams) contains 6 grams of carbs.  Blueberries: Half a cup (50 grams) contains 6 grams of carbs.  Plum: One medium-sized (80 grams) contains 6 grams of carbs.     VEGETABLES  Low carb vegetables              Delicious and Healthy Snacks     Tomato and cheese plate--mix 1/2 cup   cherry tomatoes, 1 cup fresh mini mozzarella   balls, ½ cup olives     Cottage cheese & berries--top ½ cup of   cottage cheese with 1 cup of berries of   choice. Add one handful  of pecans for some   extra protein, fat and fiber.      Apple + Greek Yogurt + Nut butter*--cut up   an apple and dip in 1/3 cup of Greek yogurt   and 2 tbsp of nut butter.     Hummus & veggies--dip baby carrots, pepper   strips, or snap peas in ¼ cup hummus.     Nuts--munch on 1oz of any kind of nut (about   ¼ cup). In the shell will help to slow down!   Carrots and cheese plate-- 1 cup of baby   carrots (or veg of choice) with ½ cup cheddar   cheese cubes and 2-4 low sodium, nitrate   free turkey slices     Yogurt & berries--mix ½-1 cup berries in a   6oz container of plain or low sugar fruit   flavored 2% Greek yogurt (less than15 grams   of sugar per serving). Chobani (Less Sugar) or   Siggis are examples.     Hard boiled egg & fruit--1 to 2 hardboiled   eggs and a tangerine or other small serving   of fruit     Tuna & avocado--put 2oz of tuna (one pouch)   on 1/3 of an avocado or 2 tablespoons   guacamole and top with salsa     String cheese & veggies--one piece cheese   (¾ oz) and 1 cup snap peas or other   vegetables     Cauliflower rice & cheese--sprinkle ¼ cup   shredded cheese on 1 cup cauliflower rice   and microwave until cheese melts     Deviled eggs--3 deviled egg halves     Bean dip & veggies- ½ cup refried beans   with ¼ cup shredded cheese melted on   top. Use as a dip for celery or red pepper   strips or eat plain.     Sargento Balanced Breaks-or make your   own with ½ oz nuts, ½ oz cheese, and 1   teaspoon dried fruit.     Edamame with fruit and nuts--1-2   mandarin orange, ¼ cup cashews, ¼ cup   edamame      Low fat chicken, egg, or tuna salad--mix   2oz chicken, tuna, or with 1 teaspoon   mayonnaise, 1 teaspoon Vinicius mustard,   and 1 teaspoon plain yogurt. Add chopped   celery, onions, walnuts, Granny Smith   apples, or dried cranberries for extra flavor.     [place] break--turkey, chicken, or nut   butter on 1 slice whole grain bread.     Protein Shake--Quintana, Core Power, Nicolas,     Protein, Fairlife     Protein Bar--Quest, Oatmega, RX Bar, [place]   Bars     Protein Chips - Legendary and Quest chips  These snacks contain protein,   fiber and fat to keep you full and   energized!   *If you have a peanut allergy, you can substitute with   Sun Butter (made from sunflower seeds) or WOW butter   (made from soy      No follow-ups on file.    Patient verbalizes understanding.    Jenise Randle PA-C  12/11/2024         [1]   Current Outpatient Medications on File Prior to Visit   Medication Sig Dispense Refill    sertraline 25 MG Oral Tab Take 1 tablet (25 mg total) by mouth daily.      NON FORMULARY Diaxinol- for blood sugar control      spironolactone 25 MG Oral Tab Take 1 tablet (25 mg total) by mouth daily.      metoprolol succinate ER 50 MG Oral Tablet 24 Hr Take 1 tablet (50 mg total) by mouth daily.      acetaminophen 325 MG Oral Tab Take 2 tablets (650 mg total) by mouth.      acetaminophen 500 MG Oral Tab Take 2 tablets (1,000 mg total) by mouth every 6 (six) hours as needed for Pain. 50 tablet 0    ibuprofen 600 MG Oral Tab Take 1 tablet (600 mg total) by mouth every 6 (six) hours as needed for Pain. 50 tablet 0     No current facility-administered medications on file prior to visit.

## 2025-01-29 ENCOUNTER — OFFICE VISIT (OUTPATIENT)
Dept: OBGYN CLINIC | Facility: CLINIC | Age: 50
End: 2025-01-29
Payer: COMMERCIAL

## 2025-01-29 VITALS
DIASTOLIC BLOOD PRESSURE: 74 MMHG | WEIGHT: 146.5 LBS | HEART RATE: 98 BPM | BODY MASS INDEX: 25.96 KG/M2 | SYSTOLIC BLOOD PRESSURE: 114 MMHG | HEIGHT: 63 IN

## 2025-01-29 DIAGNOSIS — N92.0 INTERMENSTRUAL SPOTTING: Primary | ICD-10-CM

## 2025-01-29 DIAGNOSIS — Z11.51 SCREENING FOR HUMAN PAPILLOMAVIRUS (HPV): ICD-10-CM

## 2025-01-29 DIAGNOSIS — Z12.4 SCREENING FOR CERVICAL CANCER: ICD-10-CM

## 2025-01-29 PROCEDURE — 81514 NFCT DS BV&VAGINITIS DNA ALG: CPT | Performed by: NURSE PRACTITIONER

## 2025-01-29 PROCEDURE — 88175 CYTOPATH C/V AUTO FLUID REDO: CPT | Performed by: NURSE PRACTITIONER

## 2025-01-29 PROCEDURE — 87624 HPV HI-RISK TYP POOLED RSLT: CPT | Performed by: NURSE PRACTITIONER

## 2025-01-29 PROCEDURE — 99459 PELVIC EXAMINATION: CPT | Performed by: NURSE PRACTITIONER

## 2025-01-29 PROCEDURE — 99203 OFFICE O/P NEW LOW 30 MIN: CPT | Performed by: NURSE PRACTITIONER

## 2025-01-29 NOTE — PROGRESS NOTES
Subjective:  49 year old    Chief Complaint   Patient presents with    Vaginal Bleeding     Patient is having spotting last week, 2 weeks after period.     Pt here today with complaints of intermenstrual spotting  LMP 2025  Spotted last week for 3-4 days  Recently started phentermine for weight loss  Otherwise no changes  Mother menopausal around age 50  Declines STD screen    Review of Systems:  Pertinent items are noted in the HPI.    Objective:  /74   Pulse 98   Ht 63\"   Wt 146 lb 8 oz (66.5 kg)   LMP 2025 (Exact Date)       Physical Examination:  General appearance: Well dressed, well nourished in no apparent distress  Neurologic/Psychiatric: Alert and oriented to person, place and time, mood normal, affect appropriate  Abdomen: Soft, non-tender, non-distended, no masses, no hepatosplenomegaly, no hernias, no inguinal lymphadenopathy  Pelvic:    External genitalia- Normal, Bartholin's, urethra, skeins glands normal   Vagina- No vaginal lesions, physiologic discharge   Cervix- No lesions, long/closed, no cervical motion tenderness   Uterus- Normal, non-tender, no masses   Adnexa-  Non-tender, no masses    Assessment/Plan:      Diagnoses and all orders for this visit:    Intermenstrual spotting  -     Vaginitis Vaginosis PCR Panel; Future  - declines STD screen  - will treat based on culture results  - discussed that one time spotting could be due to a variety of causes  - if persistent, will follow up with pelvic US  - to follow up with new/worsening symptoms or no improvement    Screening for cervical cancer  -     ThinPrep PAP Smear; Future    Screening for human papillomavirus (HPV)  -     Hpv Dna  High Risk , Thin Prep Collect; Future        Return for annual well woman exam .

## 2025-01-30 LAB
BV BACTERIA DNA VAG QL NAA+PROBE: NEGATIVE
C GLABRATA DNA VAG QL NAA+PROBE: NEGATIVE
C KRUSEI DNA VAG QL NAA+PROBE: NEGATIVE
CANDIDA DNA VAG QL NAA+PROBE: POSITIVE
HPV E6+E7 MRNA CVX QL NAA+PROBE: NEGATIVE
LAST PAP RESULT: NORMAL
PAP HISTORY (OTHER THAN LAST PAP): NORMAL
T VAGINALIS DNA VAG QL NAA+PROBE: NEGATIVE

## 2025-01-31 ENCOUNTER — PATIENT MESSAGE (OUTPATIENT)
Dept: OBGYN CLINIC | Facility: CLINIC | Age: 50
End: 2025-01-31

## 2025-02-03 ENCOUNTER — TELEPHONE (OUTPATIENT)
Dept: OBGYN CLINIC | Facility: CLINIC | Age: 50
End: 2025-02-03

## 2025-02-03 RX ORDER — FLUCONAZOLE 150 MG/1
TABLET ORAL
Qty: 2 TABLET | Refills: 0 | Status: SHIPPED | OUTPATIENT
Start: 2025-02-03

## 2025-02-03 RX ORDER — FLUCONAZOLE 150 MG/1
TABLET ORAL
Qty: 2 TABLET | Refills: 0 | Status: CANCELLED | OUTPATIENT
Start: 2025-02-03

## 2025-02-03 NOTE — TELEPHONE ENCOUNTER
Kathrin Valerio, APRN  1/30/2025  3:12 PM CST Back to Top      + yeast vaginitis  Rx diflucan 150mg Q72H x 2 doses  Pt notified via MCM

## 2025-02-03 NOTE — TELEPHONE ENCOUNTER
Called and spoke with pt.  Rx was never sent into her pharmacy for her yeast infection from [facility].  I have pended rx, can you please sign.  Thanks

## 2025-02-03 NOTE — TELEPHONE ENCOUNTER
Pt stated she received a message on her test results and Kathrin stated she sent medication over but pt states nothing was ever sent over. Please advise, thank you.

## 2025-02-03 NOTE — TELEPHONE ENCOUNTER
Medication Quantity Refills Start End   fluconazole (DIFLUCAN) 150 MG Oral Tab 2 tablet 0 2/3/2025 --   Sig:   Take 1 tablet by mouth now. May repeat in 72 hrs if symptoms persist. Please call office if not resolved after 2 doses.      and routed to provider for signature.    Patient notified and instructions reviewed.  Patient verbalized understanding.

## 2025-03-08 DIAGNOSIS — E66.3 OVERWEIGHT (BMI 25.0-29.9): ICD-10-CM

## 2025-03-08 DIAGNOSIS — Z51.81 ENCOUNTER FOR THERAPEUTIC DRUG LEVEL MONITORING: ICD-10-CM

## 2025-03-10 NOTE — TELEPHONE ENCOUNTER
Requesting   Requested Prescriptions     Pending Prescriptions Disp Refills    PHENTERMINE HCL 15 MG Oral Cap [Pharmacy Med Name: PHENTERMINE 15 MG CAPSULE] 30 capsule 2     Sig: TAKE 1 CAPSULE BY MOUTH EVERY DAY IN THE MORNING      LOV: 12/11/24  RTC: 3-6mo  Last Relevant Labs:   Filled: 12/11/24 #30 with 2 refills    Future Appointments   Date Time Provider Department Center   3/22/2025  2:15 PM  MR RM2 (1.5T WIDE)  [facility]   4/16/2025  2:20 PM Jenise Randle PA-C [id number] [address]   4/19/2025  1:00 PM [facility] RM1  [facility]

## 2025-03-11 RX ORDER — PHENTERMINE HYDROCHLORIDE 15 MG/1
15 CAPSULE ORAL EVERY MORNING
Qty: 30 CAPSULE | Refills: 2 | Status: SHIPPED | OUTPATIENT
Start: 2025-03-11

## 2025-04-16 ENCOUNTER — OFFICE VISIT (OUTPATIENT)
Facility: CLINIC | Age: 50
End: 2025-04-16
Payer: COMMERCIAL

## 2025-04-16 VITALS
RESPIRATION RATE: 18 BRPM | SYSTOLIC BLOOD PRESSURE: 100 MMHG | OXYGEN SATURATION: 98 % | HEIGHT: 63 IN | HEART RATE: 74 BPM | WEIGHT: 135 LBS | DIASTOLIC BLOOD PRESSURE: 70 MMHG | BODY MASS INDEX: 23.92 KG/M2

## 2025-04-16 DIAGNOSIS — I10 HYPERTENSION, UNSPECIFIED TYPE: ICD-10-CM

## 2025-04-16 DIAGNOSIS — E78.5 HYPERLIPIDEMIA, UNSPECIFIED HYPERLIPIDEMIA TYPE: ICD-10-CM

## 2025-04-16 DIAGNOSIS — Z51.81 ENCOUNTER FOR THERAPEUTIC DRUG LEVEL MONITORING: Primary | ICD-10-CM

## 2025-04-16 DIAGNOSIS — R73.01 IFG (IMPAIRED FASTING GLUCOSE): ICD-10-CM

## 2025-04-16 DIAGNOSIS — E66.3 OVERWEIGHT (BMI 25.0-29.9): ICD-10-CM

## 2025-04-16 PROCEDURE — 99214 OFFICE O/P EST MOD 30 MIN: CPT | Performed by: PHYSICIAN ASSISTANT

## 2025-04-16 RX ORDER — PHENTERMINE HYDROCHLORIDE 15 MG/1
15 CAPSULE ORAL EVERY MORNING
Qty: 30 CAPSULE | Refills: 2 | Status: SHIPPED | OUTPATIENT
Start: 2025-04-16

## 2025-04-16 NOTE — PROGRESS NOTES
HISTORY OF PRESENT ILLNESS  Chief Complaint   Patient presents with    Weight Check     -18lbs (f/u 12/11/24 153)       Amanda Suggs is a 49 year old female here for follow up in medical weight loss program.   -18lbs  Compliant on phentermine  Denies chest pain, shortness of breath, dizziness, blurred vision, headache, paresthesia, nausea/vomiting.   Food choices have been better, trying to get more protein  Fruits and veggies    Exercise/Activity: hiking, work is active  Nutrition: 24 hour food log reviewed, eating regular meals, +protein  Stress: manageable  Sleep: 7 hours/night     [facility] Follow Up    General Information  Nutrition Recall  Exercise     Sleep              Wt Readings from Last 6 Encounters:   04/16/25 135 lb (61.2 kg)   01/29/25 146 lb 8 oz (66.5 kg)   12/11/24 153 lb (69.4 kg)   10/26/24 155 lb 3.2 oz (70.4 kg)   06/06/24 147 lb 12.8 oz (67 kg)   05/21/24 149 lb (67.6 kg)            Breakfast Lunch Dinner Snacks Fluids   Reviewed           REVIEW OF SYSTEMS  GENERAL HEALTH: feels well otherwise, denied any fevers chills or night sweats   RESPIRATORY: denies shortness of breath   CARDIOVASCULAR: denies chest pain  GI: denies abdominal pain    EXAM  /70   Pulse 74   Resp 18   Ht 5' 3\" (1.6 m)   Wt 135 lb (61.2 kg)   LMP 01/05/2025 (Exact Date)   SpO2 98%   BMI 23.91 kg/m²   GENERAL: well developed, well nourished,in no apparent distress, A/O x3  SKIN: no rashes,no suspicious lesions  HEENT: atraumatic, normocephalic, OP-clear, PERRL  NECK: supple,no adenopathy  LUNGS: clear to auscultation bilaterally   CARDIO: RRR without murmur  GI: good BS's,NT/ND, no masses or HSM  EXTREMITIES: no cyanosis, no clubbing, no edema    No results found for: \"WBC\", \"RBC\", \"HGB\", \"HCT\", \"MCV\", \"MCH\", \"MCHC\", \"RDW\", \"PLT\", \"MPV\"  Lab Results   Component Value Date    GLU 96 05/23/2024    BUN 11 05/23/2024    CREATSERUM 0.64 05/23/2024    ANIONGAP 8 05/23/2024    CA 9.4 05/23/2024    OSMOCALC 285  05/23/2024     05/23/2024    K 3.6 05/23/2024     05/23/2024    CO2 26.0 05/23/2024     No results found for: \"EAG\", \"A1C\"  No results found for: \"CHOLEST\", \"TRIG\", \"HDL\", \"LDL\", \"VLDL\", \"TCHDLRATIO\", \"NONHDLC\", \"CHOLHDLRATIO\", \"CALCNONHDL\"  No results found for: \"T4F\", \"TSH\", \"TSHT4\"  No results found for: \"B12\", \"VITB12\"  No results found for: \"VITD\", \"QVITD\", \"DTLH05TC\"    Medications Ordered Prior to Encounter[1]    ASSESSMENT  Analyzed weight data:       Diagnoses and all orders for this visit:    Encounter for therapeutic drug level monitoring  -     Phentermine HCl 15 MG Oral Cap; Take 1 capsule (15 mg total) by mouth every morning.    Overweight (BMI 25.0-29.9)  -     Phentermine HCl 15 MG Oral Cap; Take 1 capsule (15 mg total) by mouth every morning.    Hypertension, unspecified type  -     Phentermine HCl 15 MG Oral Cap; Take 1 capsule (15 mg total) by mouth every morning.    Hyperlipidemia, unspecified hyperlipidemia type  -     Phentermine HCl 15 MG Oral Cap; Take 1 capsule (15 mg total) by mouth every morning.    IFG (impaired fasting glucose)  -     Phentermine HCl 15 MG Oral Cap; Take 1 capsule (15 mg total) by mouth every morning.        PLAN  Initial Weight: 153 lbs  Initial Weight Date: 12/11/24  Today's Weight: 135 lbs  5% Goal: 7.65  10% Goal: 15.3  Total Weight Loss: Net loss 18 lbs    Will continue phentermine - advised side effects and adverse effects of medication   HTN - blood pressure well controlled on current medication - advised signs and symptoms of hypotension and will monitor with continued weight loss  HLD - lifestyle changes  IFG - continue to work on low carb diet  Consistency with logging foods - protein and produce  Incorporate strength/resistance training  Nutrition: low carb diet/ recommended to eat breakfast daily/ regular protein intake  Medication use and side effects reviewed with patient.  Medication contraindications: n/a  Follow up with dietitian and  psychologist as recommended.  Discussed the role of sleep and stress in weight management.  Counseled on comprehensive weight loss plan including attention to nutrition, exercise and behavior/stress management for success. See patient instruction below for more details.  Discussed strategies to overcome barriers to successful weight loss and weight maintenance  FITTE: ACSM recommendations (150-300 minutes/ week in active weight loss)   Weight Loss consent to treat reviewed and signed       NOTE TO PATIENT: The 21st Century Cures Act makes clinical notes like these available to patients in the interest of transparency. Clinical notes are medical documents used by physicians and care providers to communicate with each other. These documents include medical language and terminology, abbreviations, and treatment information that may sound technical and at times possibly unfamiliar. In addition, at times, the verbiage may appear blunt or direct. These documents are one tool providers use to communicate relevant information and clinical opinions of the care providers in a way that allows common understanding of the clinical context.     There are no Patient Instructions on file for this visit.    No follow-ups on file.    Patient verbalizes understanding.    Jenise Randle PA-C  4/16/2025           [1]   Current Outpatient Medications on File Prior to Visit   Medication Sig Dispense Refill    Multiple Vitamins-Minerals (MULTIVITAMIN GUMMIES ADULT OR)       sertraline 25 MG Oral Tab Take 1 tablet (25 mg total) by mouth daily.      NON FORMULARY Diaxinol- for blood sugar control      spironolactone 25 MG Oral Tab Take 1 tablet (25 mg total) by mouth daily.      metoprolol succinate ER 50 MG Oral Tablet 24 Hr Take 1 tablet (50 mg total) by mouth daily.      fluconazole (DIFLUCAN) 150 MG Oral Tab Take 1 tablet by mouth now. May repeat in 72 hrs if symptoms persist. Please call office if not resolved after 2 doses. (Patient  not taking: Reported on 4/16/2025) 2 tablet 0     No current facility-administered medications on file prior to visit.

## 2025-05-30 ENCOUNTER — HOSPITAL ENCOUNTER (OUTPATIENT)
Dept: MAMMOGRAPHY | Age: 50
Discharge: HOME | End: 2025-05-30
Attending: SURGERY
Payer: COMMERCIAL

## 2025-05-30 DIAGNOSIS — R92.343 EXTREMELY DENSE TISSUE OF BOTH BREASTS ON MAMMOGRAPHY: ICD-10-CM

## 2025-05-30 DIAGNOSIS — D24.2 BENIGN PHYLLODES TUMOR OF BREAST, LEFT: ICD-10-CM

## 2025-05-30 PROCEDURE — 77067 SCR MAMMO BI INCL CAD: CPT | Performed by: SURGERY

## 2025-05-30 PROCEDURE — 77063 BREAST TOMOSYNTHESIS BI: CPT | Performed by: SURGERY

## (undated) DEVICE — SOLUTION IRRIG 1000ML 0.9% NACL USP BTL

## (undated) DEVICE — ANTIBACTERIAL UNDYED BRAIDED (POLYGLACTIN 910), SYNTHETIC ABSORBABLE SUTURE: Brand: COATED VICRYL

## (undated) DEVICE — DRAPE PACK CHEST

## (undated) DEVICE — SLEEVE COMPR MD KNEE LEN SGL USE KENDALL SCD

## (undated) DEVICE — GLOVE SUR 6.5 SENSICARE PI PIP CRM PWD F

## (undated) DEVICE — PAD,ABDOMINAL,8"X7.5",ST,LF,20/BX: Brand: MEDLINE INDUSTRIES, INC.

## (undated) DEVICE — Device

## (undated) DEVICE — ELECTRODE ES 2.75IN PTFE BLDE MOD E-Z CLN

## (undated) DEVICE — SHEATH GUID RADAR LOC FOR BRST CA SUR SAVI

## (undated) DEVICE — DRAPE,TAPE STRIPS,STERILE: Brand: MEDLINE

## (undated) DEVICE — SUT MCRYL 4-0 18IN PS-2 ABSRB UD 19MM 3/8 CIR

## (undated) DEVICE — BREAST-HERNIA-PORT CDS-LF: Brand: MEDLINE INDUSTRIES, INC.

## (undated) DEVICE — GOWN,SIRUS,FABRIC-REINFORCED,LARGE: Brand: MEDLINE

## (undated) DEVICE — 3M™ STERI-DRAPE™ INSTRUMENT POUCH 1018: Brand: STERI-DRAPE™

## (undated) DEVICE — SKIN REG/FINE DUAL MARKER, RULER, LABELS: Brand: MEDLINE

## (undated) DEVICE — GLOVE SUR 7 SENSICARE PI PIP GRN PWD F

## (undated) DEVICE — UNDERPAD INCONT 23X36IN STD BLU BACKSHEET

## (undated) DEVICE — SUT PERMA- 3-0 30IN SH NABSRB BLK 26MM 1/2